# Patient Record
Sex: MALE | Race: BLACK OR AFRICAN AMERICAN | NOT HISPANIC OR LATINO | Employment: UNEMPLOYED | URBAN - NONMETROPOLITAN AREA
[De-identification: names, ages, dates, MRNs, and addresses within clinical notes are randomized per-mention and may not be internally consistent; named-entity substitution may affect disease eponyms.]

---

## 2017-03-27 ENCOUNTER — OFFICE VISIT (OUTPATIENT)
Dept: FAMILY MEDICINE CLINIC | Facility: CLINIC | Age: 37
End: 2017-03-27

## 2017-03-27 VITALS
SYSTOLIC BLOOD PRESSURE: 142 MMHG | HEART RATE: 103 BPM | DIASTOLIC BLOOD PRESSURE: 88 MMHG | BODY MASS INDEX: 44.1 KG/M2 | OXYGEN SATURATION: 97 % | HEIGHT: 71 IN | WEIGHT: 315 LBS

## 2017-03-27 DIAGNOSIS — E66.01 MORBID OBESITY, UNSPECIFIED OBESITY TYPE (HCC): ICD-10-CM

## 2017-03-27 DIAGNOSIS — F32.5 MAJOR DEPRESSIVE DISORDER WITH SINGLE EPISODE, IN FULL REMISSION (HCC): ICD-10-CM

## 2017-03-27 DIAGNOSIS — I10 ESSENTIAL HYPERTENSION: Primary | ICD-10-CM

## 2017-03-27 DIAGNOSIS — J45.990 EXERCISE-INDUCED ASTHMA: ICD-10-CM

## 2017-03-27 DIAGNOSIS — H61.22 EXCESSIVE CERUMEN IN LEFT EAR CANAL: ICD-10-CM

## 2017-03-27 DIAGNOSIS — H93.11 TINNITUS AURIUM, RIGHT: ICD-10-CM

## 2017-03-27 PROCEDURE — 99213 OFFICE O/P EST LOW 20 MIN: CPT | Performed by: FAMILY MEDICINE

## 2017-03-27 RX ORDER — ALBUTEROL SULFATE 90 UG/1
AEROSOL, METERED RESPIRATORY (INHALATION)
Qty: 18 G | Refills: 0 | Status: SHIPPED | OUTPATIENT
Start: 2017-03-27 | End: 2019-04-12 | Stop reason: SDUPTHER

## 2017-03-27 RX ORDER — AMLODIPINE BESYLATE 5 MG/1
5 TABLET ORAL DAILY
Qty: 30 TABLET | Refills: 2 | Status: SHIPPED | OUTPATIENT
Start: 2017-03-27 | End: 2017-07-07 | Stop reason: SDUPTHER

## 2017-03-27 NOTE — PROGRESS NOTES
Subjective:     Olivia Walden is a 36 y.o. male who presents for follow up of HTN.    New concerns: None. Stopped taking his Norvasc after it ran out approx 2 months ago.    Also c/o tinnitus of his R ear x 3 months.  Hearing normal, not feeling imbalanced.     Evaluation:   Blood pressure reading not indicated for medication reasons: No   Blood pressure reading refused by patient: No   Home blood pressure readings: Unknown   Blood pressure often elevated in physician office: Yes, Grade I hypertension   Onset of symptoms: N/A  Symptoms:   Headache: no   Visual disturbance: no   Fatigue: no   Dyspnea: no   Orthopnea: no   Edema: no   Chest pain: no   Palpitations: no   Diaphoresis: no    Risk Factors:   HLD, Sedentary lifestyle    Comorbid Conditions:   None    The following portions of the patient's history were reviewed and updated as appropriate: allergies, current medications, past family history, past medical history, past social history, past surgical history and problem list.    Preventative:  Over the past 2 weeks, have you felt down, depressed, or hopeless?No   Over the past 2 weeks, have you felt little interest or pleasure in doing things?No  Clinical depression screening refused by patient.No     On osteoporosis therapy?No     Past Medical Hx:  Past Medical History:   Diagnosis Date   • Depression     PHQ score 10, 4/11/16   • Exercise-induced asthma    • Pneumonia        Past Surgical Hx:  Past Surgical History:   Procedure Laterality Date   • TONSILLECTOMY  1988       Health Maintenance:  Health Maintenance   Topic Date Due   • TDAP/TD VACCINES (2 - Td) 04/11/2026   • INFLUENZA VACCINE  Addressed       Current Meds:    Current Outpatient Prescriptions:   •  albuterol (PROVENTIL HFA;VENTOLIN HFA) 108 (90 BASE) MCG/ACT inhaler, 2 puff(s) 15 minutes before exercise, Disp: 18 g, Rfl: 0  •  amLODIPine (NORVASC) 5 MG tablet, Take 1 tablet by mouth Daily., Disp: 30 tablet, Rfl: 2  •  carbamide  "peroxide (DEBROX) 6.5 % otic solution, Administer 5 drops into both ears 2 (Two) Times a Day for 4 days., Disp: 14.8 mL, Rfl: 0    Allergies:  Review of patient's allergies indicates no known allergies.    Family Hx:  Family History   Problem Relation Age of Onset   • Diabetes Paternal Aunt    • Hypertension Neg Hx    • Pancreatic cancer Neg Hx    • Cancer Neg Hx         Social History:  Social History     Social History   • Marital status: Single     Spouse name: N/A   • Number of children: N/A   • Years of education: N/A     Occupational History   • Not on file.     Social History Main Topics   • Smoking status: Never Smoker   • Smokeless tobacco: Not on file   • Alcohol use Yes      Comment: social (1-2 drinks/week)   • Drug use: No   • Sexual activity: Not on file     Other Topics Concern   • Not on file     Social History Narrative       Review of Systems  Review of Systems   Constitutional: Negative for activity change, appetite change, fatigue and fever.   HENT: Negative for ear pain and sore throat.    Eyes: Negative for pain and visual disturbance.   Respiratory: Negative for cough and shortness of breath.    Cardiovascular: Negative for chest pain and palpitations.   Gastrointestinal: Negative for abdominal pain and nausea.   Endocrine: Negative for cold intolerance and heat intolerance.   Genitourinary: Negative for difficulty urinating and dysuria.   Musculoskeletal: Negative for arthralgias and gait problem.   Skin: Negative for color change and rash.   Neurological: Negative for dizziness, weakness and headaches.   Hematological: Negative for adenopathy. Does not bruise/bleed easily.   Psychiatric/Behavioral: Negative for agitation, confusion and sleep disturbance.     Objective:     /88  Pulse 103  Ht 71\" (180.3 cm)  Wt (!) 324 lb 4.8 oz (147 kg)  SpO2 97%  BMI 45.23 kg/m2  Physical Exam   Constitutional: He is oriented to person, place, and time. No distress.   HENT:   Head: Normocephalic " and atraumatic.   Right Ear: External ear normal.   Left Ear: External ear normal.   Nose: Nose normal.   Mouth/Throat: Oropharynx is clear and moist.   Hearing normal by rubbing finger test b/l, cerumen in L ear canal   Eyes: Conjunctivae and EOM are normal. Pupils are equal, round, and reactive to light.   Neck: Normal range of motion. Neck supple. No tracheal deviation present. No thyromegaly present.   Cardiovascular: Normal rate, regular rhythm, normal heart sounds and intact distal pulses.  Exam reveals no gallop and no friction rub.    No murmur heard.  Pulmonary/Chest: Effort normal and breath sounds normal. No respiratory distress. He has no wheezes. He has no rales. He exhibits no tenderness.   Abdominal: Soft. Bowel sounds are normal. He exhibits no distension and no mass. There is no tenderness. There is no rebound and no guarding.   Musculoskeletal: Normal range of motion. He exhibits no edema or tenderness.   Neurological: He is alert and oriented to person, place, and time.   Skin: Skin is warm and dry. No rash noted. He is not diaphoretic. No erythema. No pallor.     Lab Review  none     Assessment:     1. Essential hypertension    2. Morbid obesity, unspecified obesity type    3. Excessive cerumen in left ear canal    4. Exercise-induced asthma    5. Tinnitus aurium, right    6. Major depressive disorder with single episode, in full remission         Plan:   Olivia was seen today for follow-up, hypertension and fluid in ears.    Diagnoses and all orders for this visit:    Essential hypertension  -     amLODIPine (NORVASC) 5 MG tablet; Take 1 tablet by mouth Daily.    Morbid obesity, unspecified obesity type        - Recommended diet modification and exercise    Excessive cerumen in left ear canal  -     carbamide peroxide (DEBROX) 6.5 % otic solution; Administer 5 drops into both ears 2 (Two) Times a Day for 4 days.  Tried to call pt, but listed number is not working.  Called mother and told her to  have him call me.  - If unimproved with drops, will need referral to ENT for cerumen removal.    Exercise-induced asthma  -     albuterol (PROVENTIL HFA;VENTOLIN HFA) 108 (90 BASE) MCG/ACT inhaler; 2 puff(s) 15 minutes before exercise    Tinnitus aurium, right        - Explained to pt that not a lot can be done for tinnitus; recommended noise machine.    1.  Rx changes: Refilled Norvasc.  2.  Education:    Presented an overview of HTN, expected course, considerations, risk factors, and exacerbation prevention.   Discussed treatment options for HTN: yes   Recommended restricted dietary Na intake: yes   Discussed patient action plan for HTN: yes  3.  Compliance at present is estimated to be good. Efforts to improve compliance (if necessary) will be directed at dietary modifications: increased fruits and veggies and increased exercise.  Bp mildly elevated, advised he can buy OTC home bp unit, and check periodically for possible white coat hypertension  4.  Follow up: 3 months    GOALS:  Lose 12 lb by next appt  BARRIERS TO GOALS:  Insight    Preventative:  up to date and documented   Recommended:none  Smoking cessation counseling was provided.  does not drink  eat more fruits and vegetables, increase water intake and increase physical activity    RISK SCORE: 4          This document has been electronically signed by Rahat Boone MD on March 27, 2017 5:00 PM

## 2017-06-19 ENCOUNTER — OFFICE VISIT (OUTPATIENT)
Dept: FAMILY MEDICINE CLINIC | Facility: CLINIC | Age: 37
End: 2017-06-19

## 2017-06-19 VITALS
HEIGHT: 71 IN | HEART RATE: 101 BPM | SYSTOLIC BLOOD PRESSURE: 140 MMHG | WEIGHT: 308 LBS | DIASTOLIC BLOOD PRESSURE: 90 MMHG | BODY MASS INDEX: 43.12 KG/M2 | OXYGEN SATURATION: 96 %

## 2017-06-19 DIAGNOSIS — E66.01 MORBID OBESITY, UNSPECIFIED OBESITY TYPE (HCC): ICD-10-CM

## 2017-06-19 DIAGNOSIS — S93.401D SPRAIN OF RIGHT ANKLE, UNSPECIFIED LIGAMENT, SUBSEQUENT ENCOUNTER: ICD-10-CM

## 2017-06-19 DIAGNOSIS — I10 ESSENTIAL HYPERTENSION: Primary | ICD-10-CM

## 2017-06-19 PROBLEM — S93.409A ANKLE SPRAIN: Status: ACTIVE | Noted: 2017-06-19

## 2017-06-19 PROCEDURE — 99213 OFFICE O/P EST LOW 20 MIN: CPT | Performed by: FAMILY MEDICINE

## 2017-06-20 NOTE — PROGRESS NOTES
"  Subjective:     Olivia Walden is a 36 y.o. male who presents for follow up of HTN.    New concerns:  Right ankle pain.  He was playing football 3 weeks ago, when his cleat got stuck in the ground and his ankle \"twisted.\" Initially had some swelling, but this improved with ice.  He initially presented to [facility], reports X-rays were unremarkable, was told he has a sprain, and was subsequently discharged.  Has been trying to rest it, elevate when possible but it is painful to bear weight.    Evaluation:   Blood pressure reading not indicated for medication reasons: No   Blood pressure reading refused by patient: No   Home blood pressure readings: Does not check regularly (no machine).  170/110 last time he checked, but he had been off medication for 1 week.   Blood pressure often elevated in physician office: Yes, Grade I hypertension   Onset of symptoms: N/A  Symptoms:   Headache: no   Visual disturbance: no   Fatigue: no   Dyspnea: no   Orthopnea: no   Edema: no   Chest pain: no   Palpitations: no   Diaphoresis: no    Risk Factors:   HLD, Sedentary lifestyle    Comorbid Conditions:   None    The following portions of the patient's history were reviewed and updated as appropriate: allergies, current medications, past family history, past medical history, past social history, past surgical history and problem list.    Preventative:  Over the past 2 weeks, have you felt down, depressed, or hopeless?No   Over the past 2 weeks, have you felt little interest or pleasure in doing things?No  Clinical depression screening refused by patient.No     On osteoporosis therapy?No     Past Medical Hx:  Past Medical History:   Diagnosis Date   • Depression     PHQ score 10, 4/11/16   • Exercise-induced asthma    • Pneumonia        Past Surgical Hx:  Past Surgical History:   Procedure Laterality Date   • TONSILLECTOMY  1988       Health Maintenance:  Health Maintenance   Topic Date Due   • INFLUENZA VACCINE  " 08/01/2017   • TDAP/TD VACCINES (2 - Td) 04/11/2026       Current Meds:    Current Outpatient Prescriptions:   •  albuterol (PROVENTIL HFA;VENTOLIN HFA) 108 (90 BASE) MCG/ACT inhaler, 2 puff(s) 15 minutes before exercise, Disp: 18 g, Rfl: 0  •  amLODIPine (NORVASC) 5 MG tablet, Take 1 tablet by mouth Daily., Disp: 30 tablet, Rfl: 2    Allergies:  Review of patient's allergies indicates no known allergies.    Family Hx:  Family History   Problem Relation Age of Onset   • Diabetes Paternal Aunt    • Hypertension Neg Hx    • Pancreatic cancer Neg Hx    • Cancer Neg Hx         Social History:  Social History     Social History   • Marital status: Single     Spouse name: N/A   • Number of children: N/A   • Years of education: N/A     Occupational History   • Not on file.     Social History Main Topics   • Smoking status: Never Smoker   • Smokeless tobacco: Not on file   • Alcohol use Yes      Comment: social (1-2 drinks/week)   • Drug use: No   • Sexual activity: Not on file     Other Topics Concern   • Not on file     Social History Narrative       Review of Systems  Review of Systems   Constitutional: Negative for activity change, appetite change, fatigue and fever.   HENT: Negative for ear pain and sore throat.    Eyes: Negative for pain and visual disturbance.   Respiratory: Negative for cough and shortness of breath.    Cardiovascular: Negative for chest pain and palpitations.   Gastrointestinal: Negative for abdominal pain and nausea.   Endocrine: Negative for cold intolerance and heat intolerance.   Genitourinary: Negative for difficulty urinating and dysuria.   Musculoskeletal: Negative for arthralgias and gait problem.   Skin: Negative for color change and rash.   Neurological: Negative for dizziness, weakness and headaches.   Hematological: Negative for adenopathy. Does not bruise/bleed easily.   Psychiatric/Behavioral: Negative for agitation, confusion and sleep disturbance.     Objective:     /90   "Pulse 101  Ht 71\" (180.3 cm)  Wt (!) 308 lb (140 kg)  SpO2 96%  BMI 42.96 kg/m2  Physical Exam   Constitutional: He is oriented to person, place, and time. No distress.   HENT:   Head: Normocephalic and atraumatic.   Right Ear: External ear normal.   Left Ear: External ear normal.   Nose: Nose normal.   Mouth/Throat: Oropharynx is clear and moist.   Eyes: Conjunctivae and EOM are normal. Pupils are equal, round, and reactive to light.   Neck: Normal range of motion. Neck supple. No tracheal deviation present. No thyromegaly present.   Cardiovascular: Normal rate, regular rhythm, normal heart sounds and intact distal pulses.  Exam reveals no gallop and no friction rub.    No murmur heard.  Pulmonary/Chest: Effort normal and breath sounds normal. No respiratory distress. He has no wheezes. He has no rales. He exhibits no tenderness.   Abdominal: Soft. Bowel sounds are normal. He exhibits no distension and no mass. There is no tenderness. There is no rebound and no guarding.   Musculoskeletal: Normal range of motion. He exhibits no edema.        Right ankle: He exhibits no swelling, no ecchymosis, no deformity and no laceration. Tenderness. Lateral malleolus and medial malleolus tenderness found. Achilles tendon exhibits pain. Achilles tendon exhibits no defect and normal Mason's test results.   Neurological: He is alert and oriented to person, place, and time.   Skin: Skin is warm and dry. No rash noted. He is not diaphoretic. No erythema. No pallor.     Lab Review  none     Assessment:     1. Essential hypertension    2. Morbid obesity, unspecified obesity type    3. Sprain of right ankle, unspecified ligament, subsequent encounter         Plan:   1. Essential hypertension    2. Morbid obesity, unspecified obesity type  -Patient has lost 16 lb since last seen, through diet modification and exercise.  Encouraged patient on his progress, explained that blood pressure will improve as he continues to lose " weight.    3. Sprain of right ankle, unspecified ligament, subsequent encounter  -Educational materials given to patient (DASH diet, ankle sprain including exercises)    1.  Rx changes: None  2.  Education:    Presented an overview of HTN, expected course, considerations, risk factors, and exacerbation prevention.   Discussed treatment options for HTN: yes   Recommended restricted dietary Na intake: yes   Discussed patient action plan for HTN: yes  3.  Compliance at present is estimated to be good. Efforts to improve compliance (if necessary) will be directed at dietary modifications: increased fruits and veggies and increased exercise.  Bp mildly elevated, advised he can buy OTC home bp unit, and check periodically for possible white coat hypertension  4.  Follow up: 1 months    GOALS:  Lose 4-8 lb by next appt  BARRIERS TO GOALS:  Insight    Preventative:  up to date and documented   Recommended:none  Smoking cessation counseling was provided.  does not drink  eat more fruits and vegetables, increase water intake and increase physical activity    RISK SCORE: 4          This document has been electronically signed by Rahat Boone MD on June 19, 2017 9:34 PM

## 2017-07-07 DIAGNOSIS — I10 ESSENTIAL HYPERTENSION: ICD-10-CM

## 2017-07-07 RX ORDER — AMLODIPINE BESYLATE 5 MG/1
TABLET ORAL
Qty: 30 TABLET | Refills: 0 | Status: SHIPPED | OUTPATIENT
Start: 2017-07-07 | End: 2017-07-18 | Stop reason: SDUPTHER

## 2017-07-18 ENCOUNTER — OFFICE VISIT (OUTPATIENT)
Dept: FAMILY MEDICINE CLINIC | Facility: CLINIC | Age: 37
End: 2017-07-18

## 2017-07-18 VITALS
WEIGHT: 307 LBS | HEIGHT: 71 IN | HEART RATE: 96 BPM | DIASTOLIC BLOOD PRESSURE: 72 MMHG | OXYGEN SATURATION: 96 % | BODY MASS INDEX: 42.98 KG/M2 | SYSTOLIC BLOOD PRESSURE: 136 MMHG

## 2017-07-18 DIAGNOSIS — E66.01 MORBID OBESITY, UNSPECIFIED OBESITY TYPE (HCC): ICD-10-CM

## 2017-07-18 DIAGNOSIS — I10 ESSENTIAL HYPERTENSION: Primary | ICD-10-CM

## 2017-07-18 DIAGNOSIS — M72.2 PLANTAR FASCIITIS: ICD-10-CM

## 2017-07-18 PROCEDURE — 99213 OFFICE O/P EST LOW 20 MIN: CPT | Performed by: FAMILY MEDICINE

## 2017-07-18 RX ORDER — AMLODIPINE BESYLATE 5 MG/1
5 TABLET ORAL DAILY
Qty: 30 TABLET | Refills: 3 | Status: SHIPPED | OUTPATIENT
Start: 2017-07-18 | End: 2017-10-20 | Stop reason: SDUPTHER

## 2017-07-18 NOTE — PROGRESS NOTES
Subjective:     Chief Complaint   Patient presents with   • Hypertension   • Ankle Injury   • Foot Pain     Olivia Walden is a 37 y.o. male who presents for follow up of HTN, ankle sprain.    He states his ankle pain is considerably improved.  He has been doing ankle exercises as instructed.  However he has noticed right foot pain, on the plantar surface.  When asked if the pain is primarily is with the first step in the morning he says sometimes, sometimes not.    Evaluation:   Blood pressure reading not indicated for medication reasons: No   Blood pressure reading refused by patient: No   Home blood pressure readings: Does not check regularly (no machine).   Blood pressure often elevated in physician office: Yes, Grade I hypertension   Onset of symptoms: N/A  Symptoms:   Headache: no   Visual disturbance: no   Fatigue: no   Dyspnea: no   Orthopnea: no   Edema: no   Chest pain: no   Palpitations: no   Diaphoresis: no    Risk Factors:   HLD, Sedentary lifestyle    Comorbid Conditions:   None    The following portions of the patient's history were reviewed and updated as appropriate: allergies, current medications, past family history, past medical history, past social history, past surgical history and problem list.    Preventative:  Over the past 2 weeks, have you felt down, depressed, or hopeless?No   Over the past 2 weeks, have you felt little interest or pleasure in doing things?No  Clinical depression screening refused by patient.No      PHQ-9 Depression Screening 7/18/2017   Little interest or pleasure in doing things 1   Feeling down, depressed, or hopeless 0   Trouble falling or staying asleep, or sleeping too much 0   Feeling tired or having little energy 1   Poor appetite or overeating 0   Feeling bad about yourself - or that you are a failure or have let yourself or your family down 0   Trouble concentrating on things, such as reading the newspaper or watching television 0   Moving or speaking  so slowly that other people could have noticed. Or the opposite - being so fidgety or restless that you have been moving around a lot more than usual 0   Thoughts that you would be better off dead, or of hurting yourself in some way 0   PHQ-9 Total Score 2   If you checked off any problems, how difficult have these problems made it for you to do your work, take care of things at home, or get along with other people? Not difficult at all       On osteoporosis therapy?No     Past Medical Hx:  Past Medical History:   Diagnosis Date   • Depression     PHQ score 10, 4/11/16   • Exercise-induced asthma    • Pneumonia        Past Surgical Hx:  Past Surgical History:   Procedure Laterality Date   • TONSILLECTOMY  1988       Health Maintenance:  Health Maintenance   Topic Date Due   • INFLUENZA VACCINE  08/01/2017   • TDAP/TD VACCINES (2 - Td) 04/11/2026       Current Meds:    Current Outpatient Prescriptions:   •  albuterol (PROVENTIL HFA;VENTOLIN HFA) 108 (90 BASE) MCG/ACT inhaler, 2 puff(s) 15 minutes before exercise, Disp: 18 g, Rfl: 0  •  amLODIPine (NORVASC) 5 MG tablet, Take 1 tablet by mouth Daily., Disp: 30 tablet, Rfl: 3    Allergies:  Review of patient's allergies indicates no known allergies.    Family Hx:  Family History   Problem Relation Age of Onset   • Diabetes Paternal Aunt    • Hypertension Neg Hx    • Pancreatic cancer Neg Hx    • Cancer Neg Hx         Social History:  Social History     Social History   • Marital status: Single     Spouse name: N/A   • Number of children: N/A   • Years of education: N/A     Occupational History   • Not on file.     Social History Main Topics   • Smoking status: Never Smoker   • Smokeless tobacco: Not on file   • Alcohol use Yes      Comment: social (1-2 drinks/week)   • Drug use: No   • Sexual activity: Not on file     Other Topics Concern   • Not on file     Social History Narrative       Review of Systems  Review of Systems   Constitutional: Negative for activity  "change, appetite change, fatigue and fever.   HENT: Negative for ear pain and sore throat.    Eyes: Negative for pain and visual disturbance.   Respiratory: Negative for cough and shortness of breath.    Cardiovascular: Negative for chest pain and palpitations.   Gastrointestinal: Negative for abdominal pain and nausea.   Endocrine: Negative for cold intolerance and heat intolerance.   Genitourinary: Negative for difficulty urinating and dysuria.   Musculoskeletal: Negative for arthralgias and gait problem.   Skin: Negative for color change and rash.   Neurological: Negative for dizziness, weakness and headaches.   Hematological: Negative for adenopathy. Does not bruise/bleed easily.   Psychiatric/Behavioral: Negative for agitation, confusion and sleep disturbance.     Objective:     /72 (BP Location: Left arm, Patient Position: Sitting, Cuff Size: Adult)  Pulse 96  Ht 71\" (180.3 cm)  Wt (!) 307 lb (139 kg)  SpO2 96%  BMI 42.82 kg/m2  Physical Exam   Constitutional: He is oriented to person, place, and time. No distress.   HENT:   Head: Normocephalic and atraumatic.   Right Ear: External ear normal.   Left Ear: External ear normal.   Nose: Nose normal.   Mouth/Throat: Oropharynx is clear and moist.   Eyes: Conjunctivae and EOM are normal. Pupils are equal, round, and reactive to light.   Neck: Normal range of motion. Neck supple. No tracheal deviation present. No thyromegaly present.   Cardiovascular: Normal rate, regular rhythm, normal heart sounds and intact distal pulses.  Exam reveals no gallop and no friction rub.    No murmur heard.  Pulmonary/Chest: Effort normal and breath sounds normal. No respiratory distress. He has no wheezes. He has no rales. He exhibits no tenderness.   Abdominal: Soft. Bowel sounds are normal. He exhibits no distension and no mass. There is no tenderness. There is no rebound and no guarding.   Musculoskeletal: Normal range of motion. He exhibits no edema.        Right foot: " There is tenderness.   Calcaneal tenderness, but not at medial tubercle   Neurological: He is alert and oriented to person, place, and time.   Skin: Skin is warm and dry. No rash noted. He is not diaphoretic. No erythema. No pallor.     Lab Review  none     Assessment:     1. Essential hypertension    2. Morbid obesity, unspecified obesity type    3. Atypical Plantar fasciitis         Plan:   Olivia was seen today for hypertension, ankle injury and foot pain.    Diagnoses and all orders for this visit:    Essential hypertension  -     amLODIPine (NORVASC) 5 MG tablet; Take 1 tablet by mouth Daily.    Morbid obesity, unspecified obesity type        - Patient has lost 1 lb since last seen, through diet modification and exercise.  Encouraged patient on his progress, explained that blood pressure will improve as he continues to lose weight.    Atypical Plantar fasciitis       - Educational materials given to patient (exercises), recommended to buy heel insert, continue with OTC anti-inflammatories    1.  Rx changes: None  2.  Education:    Presented an overview of HTN, expected course, considerations, risk factors, and exacerbation prevention.   Discussed treatment options for HTN: yes   Recommended restricted dietary Na intake: yes   Discussed patient action plan for HTN: yes  3.  Compliance at present is estimated to be good. Efforts to improve compliance (if necessary) will be directed at dietary modifications: increased fruits and veggies and increased exercise.  Bp mildly elevated, advised he can buy OTC home bp unit, and check periodically for possible white coat hypertension  4.  Follow up: 3 months    GOALS:  Lose 4-8 lb by next appt  BARRIERS TO GOALS:  Insight    Preventative:  up to date and documented   Recommended:none  Smoking cessation counseling was provided.  does not drink  eat more fruits and vegetables, increase water intake and increase physical activity    RISK SCORE: 4          This document has  been electronically signed by Rahat Boone MD on July 18, 2017 11:10 PM

## 2017-07-24 NOTE — PROGRESS NOTES
I have reviewed the notes, assessments, and/or procedures performed by Dr. Boone, I concur with her/his documentation of Olivia Walden.

## 2017-08-16 DIAGNOSIS — I10 ESSENTIAL HYPERTENSION: ICD-10-CM

## 2017-08-17 RX ORDER — AMLODIPINE BESYLATE 5 MG/1
TABLET ORAL
Qty: 30 TABLET | Refills: 0 | Status: SHIPPED | OUTPATIENT
Start: 2017-08-17 | End: 2017-09-30 | Stop reason: SDUPTHER

## 2017-09-30 DIAGNOSIS — I10 ESSENTIAL HYPERTENSION: ICD-10-CM

## 2017-10-02 RX ORDER — AMLODIPINE BESYLATE 5 MG/1
TABLET ORAL
Qty: 90 TABLET | Refills: 1 | Status: SHIPPED | OUTPATIENT
Start: 2017-10-02 | End: 2017-10-20 | Stop reason: SDUPTHER

## 2017-10-20 ENCOUNTER — OFFICE VISIT (OUTPATIENT)
Dept: FAMILY MEDICINE CLINIC | Facility: CLINIC | Age: 37
End: 2017-10-20

## 2017-10-20 VITALS
SYSTOLIC BLOOD PRESSURE: 148 MMHG | DIASTOLIC BLOOD PRESSURE: 88 MMHG | HEART RATE: 71 BPM | BODY MASS INDEX: 44.1 KG/M2 | WEIGHT: 315 LBS | HEIGHT: 71 IN | OXYGEN SATURATION: 96 %

## 2017-10-20 DIAGNOSIS — J45.990 EXERCISE-INDUCED ASTHMA: ICD-10-CM

## 2017-10-20 DIAGNOSIS — I10 ESSENTIAL HYPERTENSION: Primary | ICD-10-CM

## 2017-10-20 DIAGNOSIS — E66.01 MORBID OBESITY (HCC): ICD-10-CM

## 2017-10-20 DIAGNOSIS — H61.22 EXCESSIVE CERUMEN IN LEFT EAR CANAL: ICD-10-CM

## 2017-10-20 PROCEDURE — 99213 OFFICE O/P EST LOW 20 MIN: CPT | Performed by: FAMILY MEDICINE

## 2017-10-20 RX ORDER — AMLODIPINE BESYLATE 5 MG/1
5 TABLET ORAL DAILY
Qty: 30 TABLET | Refills: 1 | Status: SHIPPED | OUTPATIENT
Start: 2017-10-20 | End: 2017-11-27 | Stop reason: SDUPTHER

## 2017-10-20 NOTE — PROGRESS NOTES
Subjective:     Chief Complaint   Patient presents with   • Hypertension     Olivia Walden is a 37 y.o. male who presents for follow up of HTN, plantar fasciitis.    He states his plantar fasciitis pain has significantly improved since he's been doing the exercises.    Evaluation:   Blood pressure reading not indicated for medication reasons: No   Blood pressure reading refused by patient: No   Home blood pressure readings: Does not check regularly (no machine).  They do check his blood pressure at work every 2 weeks (on Wednesday's), however he has not had it checked recently there.   Blood pressure often elevated in physician office: Yes, Grade I hypertension   Onset of symptoms: N/A  Symptoms:   Headache: no   Visual disturbance: no   Fatigue: no   Dyspnea: no   Orthopnea: no   Edema: no   Chest pain: no   Palpitations: no   Diaphoresis: no    Risk Factors:   HLD, Sedentary lifestyle    Comorbid Conditions:   None    The following portions of the patient's history were reviewed and updated as appropriate: allergies, current medications, past family history, past medical history, past social history, past surgical history and problem list.    Preventative:  Over the past 2 weeks, have you felt down, depressed, or hopeless?No   Over the past 2 weeks, have you felt little interest or pleasure in doing things?No  Clinical depression screening refused by patient.No      PHQ-2/PHQ-9 Depression Screening 7/18/2017   Little interest or pleasure in doing things 1   Feeling down, depressed, or hopeless 0   Trouble falling or staying asleep, or sleeping too much 0   Feeling tired or having little energy 1   Poor appetite or overeating 0   Feeling bad about yourself - or that you are a failure or have let yourself or your family down 0   Trouble concentrating on things, such as reading the newspaper or watching television 0   Moving or speaking so slowly that other people could have noticed. Or the opposite - being  so fidgety or restless that you have been moving around a lot more than usual 0   Thoughts that you would be better off dead, or of hurting yourself in some way 0   Total Score 2   If you checked off any problems, how difficult have these problems made it for you to do your work, take care of things at home, or get along with other people? Not difficult at all       On osteoporosis therapy?No     Past Medical Hx:  Past Medical History:   Diagnosis Date   • Depression     PHQ score 10, 4/11/16   • Exercise-induced asthma    • Pneumonia        Past Surgical Hx:  Past Surgical History:   Procedure Laterality Date   • TONSILLECTOMY  1988       Health Maintenance:  Health Maintenance   Topic Date Due   • TDAP/TD VACCINES (2 - Td) 04/11/2026   • INFLUENZA VACCINE  Addressed       Current Meds:    Current Outpatient Prescriptions:   •  albuterol (PROVENTIL HFA;VENTOLIN HFA) 108 (90 BASE) MCG/ACT inhaler, 2 puff(s) 15 minutes before exercise, Disp: 18 g, Rfl: 0  •  amLODIPine (NORVASC) 5 MG tablet, Take 1 tablet by mouth Daily., Disp: 30 tablet, Rfl: 1    Allergies:  Review of patient's allergies indicates no known allergies.    Family Hx:  Family History   Problem Relation Age of Onset   • Diabetes Paternal Aunt    • Hypertension Neg Hx    • Pancreatic cancer Neg Hx    • Cancer Neg Hx         Social History:  Social History     Social History   • Marital status: Single     Spouse name: N/A   • Number of children: N/A   • Years of education: N/A     Occupational History   • Not on file.     Social History Main Topics   • Smoking status: Never Smoker   • Smokeless tobacco: Not on file   • Alcohol use Yes      Comment: social (1-2 drinks/week)   • Drug use: No   • Sexual activity: Not on file     Other Topics Concern   • Not on file     Social History Narrative       Review of Systems  Review of Systems   Constitutional: Negative for activity change, appetite change, fatigue and fever.   HENT: Positive for ear pain (not  "\"pain\" but abnormal sensation). Negative for sore throat.    Eyes: Negative for pain and visual disturbance.   Respiratory: Negative for cough and shortness of breath.    Cardiovascular: Negative for chest pain and palpitations.   Gastrointestinal: Negative for abdominal pain and nausea.   Endocrine: Negative for cold intolerance and heat intolerance.   Genitourinary: Negative for difficulty urinating and dysuria.   Musculoskeletal: Negative for arthralgias and gait problem.   Skin: Negative for color change and rash.   Neurological: Negative for dizziness, weakness and headaches.   Hematological: Negative for adenopathy. Does not bruise/bleed easily.   Psychiatric/Behavioral: Negative for agitation, confusion and sleep disturbance.     Objective:     /88 (BP Location: Left arm, Patient Position: Sitting, Cuff Size: Adult)  Pulse 71  Ht 71\" (180.3 cm)  Wt (!) 318 lb 6 oz (144 kg)  SpO2 96%  BMI 44.4 kg/m2  Physical Exam   Constitutional: He is oriented to person, place, and time. No distress.   HENT:   Head: Normocephalic and atraumatic.   Right Ear: External ear normal.   Left Ear: External ear normal.   Nose: Nose normal.   Mouth/Throat: Oropharynx is clear and moist.   Cerumen bilaterally   Eyes: Conjunctivae and EOM are normal. Pupils are equal, round, and reactive to light.   Neck: Normal range of motion. Neck supple. No tracheal deviation present. No thyromegaly present.   Cardiovascular: Normal rate, regular rhythm, normal heart sounds and intact distal pulses.  Exam reveals no gallop and no friction rub.    No murmur heard.  Pulmonary/Chest: Effort normal and breath sounds normal. No respiratory distress. He has no wheezes. He has no rales. He exhibits no tenderness.   Abdominal: Soft. Bowel sounds are normal. He exhibits no distension and no mass. There is no tenderness. There is no rebound and no guarding.   Musculoskeletal: Normal range of motion. He exhibits no edema.   Neurological: He is " alert and oriented to person, place, and time.   Skin: Skin is warm and dry. No rash noted. He is not diaphoretic. No erythema. No pallor.     Lab Review  none     Assessment:     1. Essential hypertension    2. Exercise-induced asthma    3. Morbid obesity    4. Excessive cerumen in left ear canal         Plan:   Olivia was seen today for hypertension.    Diagnoses and all orders for this visit:    Essential hypertension  - Repeat values were again elevated  -     amLODIPine (NORVASC) 5 MG tablet; Take 1 tablet by mouth Daily.  - Advised to closely monitor blood pressure, at the very least have it checked at work, bring readings to follow-up appointment.  No medication changes at this time.  Discussed importance of DASH diet once again.    Exercise-induced asthma    Morbid obesity         - Gained 11 pounds since last seen.  Recommended diet modification and exercise.    Excessive cerumen in left ear canal         - Can resume Debrox (still has some)    1.  Rx changes: None  2.  Education:    Presented an overview of HTN, expected course, considerations, risk factors, and exacerbation prevention.   Discussed treatment options for HTN: yes   Recommended restricted dietary Na intake: yes   Discussed patient action plan for HTN: yes  3.  Compliance at present is estimated to be good. Efforts to improve compliance (if necessary) will be directed at dietary modifications: increased fruits and veggies and increased exercise.    4.  Follow up: 1 months    GOALS:  Lose 4 lb by next appt  BARRIERS TO GOALS:  Insight    Preventative:  up to date and documented   Recommended:none.  Had flu shot at work.  Smoking cessation counseling was provided.  does not drink  eat more fruits and vegetables, increase water intake and increase physical activity    RISK SCORE: 4          This document has been electronically signed by Rahat Boone MD on October 22, 2017 7:15 PM

## 2017-10-23 NOTE — PROGRESS NOTES
I have reviewed the notes, assessments, and/or procedures performed by Rahat Boone MD , I concur with her/his documentation of Olivia Walden.         This document has been electronically signed by Ashley Hilario MD on October 23, 2017 9:27 AM

## 2017-11-27 ENCOUNTER — OFFICE VISIT (OUTPATIENT)
Dept: FAMILY MEDICINE CLINIC | Facility: CLINIC | Age: 37
End: 2017-11-27

## 2017-11-27 VITALS
BODY MASS INDEX: 44.1 KG/M2 | HEIGHT: 71 IN | TEMPERATURE: 97.8 F | WEIGHT: 315 LBS | DIASTOLIC BLOOD PRESSURE: 96 MMHG | OXYGEN SATURATION: 95 % | SYSTOLIC BLOOD PRESSURE: 150 MMHG | HEART RATE: 102 BPM

## 2017-11-27 DIAGNOSIS — I10 ESSENTIAL HYPERTENSION: Primary | ICD-10-CM

## 2017-11-27 DIAGNOSIS — H91.93 DECREASED HEARING OF BOTH EARS: ICD-10-CM

## 2017-11-27 DIAGNOSIS — E66.01 MORBID OBESITY (HCC): ICD-10-CM

## 2017-11-27 PROCEDURE — 99213 OFFICE O/P EST LOW 20 MIN: CPT | Performed by: FAMILY MEDICINE

## 2017-11-27 RX ORDER — AMLODIPINE BESYLATE 10 MG/1
10 TABLET ORAL DAILY
Qty: 30 TABLET | Refills: 3 | Status: SHIPPED | OUTPATIENT
Start: 2017-11-27 | End: 2018-09-26

## 2017-11-27 RX ORDER — AMLODIPINE BESYLATE 5 MG/1
10 TABLET ORAL DAILY
Qty: 30 TABLET | Refills: 1 | Status: SHIPPED | OUTPATIENT
Start: 2017-11-27 | End: 2017-11-27 | Stop reason: DRUGHIGH

## 2017-11-28 PROBLEM — H91.93 DECREASED HEARING OF BOTH EARS: Status: ACTIVE | Noted: 2017-11-28

## 2017-11-29 NOTE — PROGRESS NOTES
Subjective:     Chief Complaint   Patient presents with   • Hypertension     Olivia Walden is a 37 y.o. male who presents for follow up of HTN    Evaluation:   Blood pressure reading not indicated for medication reasons: No   Blood pressure reading refused by patient: No   Home blood pressure readings: Does not check regularly (no machine).  They do check it periodically at work, he states was checked once since I last saw him and he thinks the systolic was in the 180s.   Blood pressure often elevated in physician office: Yes, Grade I hypertension   Onset of symptoms: N/A  Symptoms:   Headache: no   Visual disturbance: no   Fatigue: no   Dyspnea: no   Orthopnea: no   Edema: no   Chest pain: no   Palpitations: no   Diaphoresis: no    Risk Factors:   HLD, Sedentary lifestyle    Comorbid Conditions:   None    The following portions of the patient's history were reviewed and updated as appropriate: allergies, current medications, past family history, past medical history, past social history, past surgical history and problem list.    Preventative:  Over the past 2 weeks, have you felt down, depressed, or hopeless?No   Over the past 2 weeks, have you felt little interest or pleasure in doing things?No  Clinical depression screening refused by patient.No      PHQ-2/PHQ-9 Depression Screening 7/18/2017   Little interest or pleasure in doing things 1   Feeling down, depressed, or hopeless 0   Trouble falling or staying asleep, or sleeping too much 0   Feeling tired or having little energy 1   Poor appetite or overeating 0   Feeling bad about yourself - or that you are a failure or have let yourself or your family down 0   Trouble concentrating on things, such as reading the newspaper or watching television 0   Moving or speaking so slowly that other people could have noticed. Or the opposite - being so fidgety or restless that you have been moving around a lot more than usual 0   Thoughts that you would be better  "off dead, or of hurting yourself in some way 0   Total Score 2   If you checked off any problems, how difficult have these problems made it for you to do your work, take care of things at home, or get along with other people? Not difficult at all       On osteoporosis therapy?No     Past Medical Hx:  Past Medical History:   Diagnosis Date   • Depression     PHQ score 10, 4/11/16   • Exercise-induced asthma    • Pneumonia        Past Surgical Hx:  Past Surgical History:   Procedure Laterality Date   • TONSILLECTOMY  1988       Health Maintenance:  Health Maintenance   Topic Date Due   • TDAP/TD VACCINES (2 - Td) 04/11/2026   • INFLUENZA VACCINE  Addressed       Current Meds:    Current Outpatient Prescriptions:   •  albuterol (PROVENTIL HFA;VENTOLIN HFA) 108 (90 BASE) MCG/ACT inhaler, 2 puff(s) 15 minutes before exercise, Disp: 18 g, Rfl: 0  •  amLODIPine (NORVASC) 10 MG tablet, Take 1 tablet by mouth Daily., Disp: 30 tablet, Rfl: 3    Allergies:  Review of patient's allergies indicates no known allergies.    Family Hx:  Family History   Problem Relation Age of Onset   • Diabetes Paternal Aunt    • Hypertension Neg Hx    • Pancreatic cancer Neg Hx    • Cancer Neg Hx         Social History:  Social History     Social History   • Marital status: Single     Spouse name: N/A   • Number of children: N/A   • Years of education: N/A     Occupational History   • Not on file.     Social History Main Topics   • Smoking status: Never Smoker   • Smokeless tobacco: Not on file   • Alcohol use Yes      Comment: social (1-2 drinks/week)   • Drug use: No   • Sexual activity: Not on file     Other Topics Concern   • Not on file     Social History Narrative       Review of Systems  Review of Systems   Constitutional: Negative for activity change, appetite change, fatigue and fever.   HENT: Positive for ear pain (not \"pain\" but abnormal sensation) and hearing loss (difficulty hearing voices at times). Negative for sore throat.    Eyes: " "Negative for pain and visual disturbance.   Respiratory: Negative for cough and shortness of breath.    Cardiovascular: Negative for chest pain and palpitations.   Gastrointestinal: Negative for abdominal pain and nausea.   Endocrine: Negative for cold intolerance and heat intolerance.   Genitourinary: Negative for difficulty urinating and dysuria.   Musculoskeletal: Negative for arthralgias and gait problem.   Skin: Negative for color change and rash.   Neurological: Negative for dizziness, weakness and headaches.   Hematological: Negative for adenopathy. Does not bruise/bleed easily.   Psychiatric/Behavioral: Negative for agitation, confusion and sleep disturbance.     Objective:     Vitals:    11/27/17 1028 11/27/17 1049   BP: (!) 164/109 150/96   BP Location: Right arm Left arm   Patient Position: Sitting    Cuff Size: Large Adult    Pulse: 102    Temp: 97.8 °F (36.6 °C)    SpO2: 95%    Weight: (!) 328 lb 1.6 oz (149 kg)    Height: 71\" (180.3 cm)      Physical Exam   Constitutional: He is oriented to person, place, and time. No distress.   HENT:   Head: Normocephalic and atraumatic.   Right Ear: External ear normal.   Left Ear: External ear normal.   Nose: Nose normal.   Mouth/Throat: Oropharynx is clear and moist.   Cerumen bilaterally   Eyes: Conjunctivae and EOM are normal. Pupils are equal, round, and reactive to light.   Neck: Normal range of motion. Neck supple. No tracheal deviation present. No thyromegaly present.   Cardiovascular: Normal rate, regular rhythm, normal heart sounds and intact distal pulses.  Exam reveals no gallop and no friction rub.    No murmur heard.  Pulmonary/Chest: Effort normal and breath sounds normal. No respiratory distress. He has no wheezes. He has no rales. He exhibits no tenderness.   Abdominal: Soft. Bowel sounds are normal. He exhibits no distension and no mass. There is no tenderness. There is no rebound and no guarding.   Musculoskeletal: Normal range of motion. He " exhibits no edema.   Neurological: He is alert and oriented to person, place, and time.   Skin: Skin is warm and dry. No rash noted. He is not diaphoretic. No erythema. No pallor.     Lab Review  none     Assessment/Plan     1. Essential hypertension    2. Decreased hearing of both ears    3. Morbid obesity         Plan:   Olivia was seen today for hypertension.    Diagnoses and all orders for this visit:    Essential hypertension  -     Blood Pressure Device  -     amLODIPine (NORVASC) 10 MG tablet; Take 1 tablet by mouth Daily.    Decreased hearing of both ears  -     Ambulatory Referral to Audiology    Morbid obesity        - Gained 10 pounds since last seen.  Recommended diet modification and exercise.    1.  Rx changes: will increase Norvasc to 10 mg, advised to buy home blood pressure device and check daily  2.  Education:    Presented an overview of HTN, expected course, considerations, risk factors, and exacerbation prevention.   Discussed treatment options for HTN: yes   Recommended restricted dietary Na intake: yes   Discussed patient action plan for HTN: yes  3.  Compliance at present is estimated to be good. Efforts to improve compliance (if necessary) will be directed at dietary modifications: increased fruits and veggies and increased exercise.    4.  Follow up: 1 months    GOALS:  Lose 4 lb by next appt  BARRIERS TO GOALS:  Insight    Preventative:  up to date and documented   Recommended:none.  Had flu shot at work.  Smoking cessation counseling was provided.  does not drink  eat more fruits and vegetables, increase water intake and increase physical activity    RISK SCORE: 4          This document has been electronically signed by Rahat Boone MD on November 28, 2017 10:51 PM

## 2017-11-29 NOTE — PROGRESS NOTES
I have reviewed the notes, assessments, and/or procedures performed by Rahat Boone MD, I concur with her/his documentation of Olivia Walden.         This document has been electronically signed by Ashley Hilario MD on November 29, 2017 5:01 PM

## 2018-01-10 ENCOUNTER — OFFICE VISIT (OUTPATIENT)
Dept: FAMILY MEDICINE CLINIC | Facility: CLINIC | Age: 38
End: 2018-01-10

## 2018-01-10 VITALS
WEIGHT: 315 LBS | HEART RATE: 89 BPM | DIASTOLIC BLOOD PRESSURE: 90 MMHG | SYSTOLIC BLOOD PRESSURE: 160 MMHG | BODY MASS INDEX: 44.1 KG/M2 | HEIGHT: 71 IN | OXYGEN SATURATION: 95 %

## 2018-01-10 DIAGNOSIS — I10 ESSENTIAL HYPERTENSION: Primary | ICD-10-CM

## 2018-01-10 DIAGNOSIS — R60.9 EDEMA, UNSPECIFIED TYPE: ICD-10-CM

## 2018-01-10 DIAGNOSIS — E66.01 MORBID OBESITY (HCC): ICD-10-CM

## 2018-01-10 PROCEDURE — 99213 OFFICE O/P EST LOW 20 MIN: CPT | Performed by: FAMILY MEDICINE

## 2018-01-10 RX ORDER — HYDROCHLOROTHIAZIDE 12.5 MG/1
12.5 TABLET ORAL DAILY
Qty: 30 TABLET | Refills: 3 | Status: SHIPPED | OUTPATIENT
Start: 2018-01-10 | End: 2018-12-29 | Stop reason: SDUPTHER

## 2018-01-10 NOTE — PROGRESS NOTES
Subjective:     Chief Complaint   Patient presents with   • Hypertension     Olivia Walden is a 37 y.o. male who presents for follow up of HTN    Evaluation:   Blood pressure reading not indicated for medication reasons: No   Blood pressure reading refused by patient: No   Home blood pressure readings: checks irregularly, states readings similar to clinic   Blood pressure often elevated in physician office: Yes, Grade I hypertension   Onset of symptoms: N/A  Symptoms:   Headache: no   Visual disturbance: no   Fatigue: no   Dyspnea: no   Orthopnea: no   Edema: yes, LLE   Chest pain: no   Palpitations: no   Diaphoresis: no    Risk Factors:   HLD, Sedentary lifestyle    Comorbid Conditions:   None    The following portions of the patient's history were reviewed and updated as appropriate: allergies, current medications, past family history, past medical history, past social history, past surgical history and problem list.    Preventative:  Over the past 2 weeks, have you felt down, depressed, or hopeless?No   Over the past 2 weeks, have you felt little interest or pleasure in doing things?No  Clinical depression screening refused by patient.No      PHQ-2/PHQ-9 Depression Screening 7/18/2017   Little interest or pleasure in doing things 1   Feeling down, depressed, or hopeless 0   Trouble falling or staying asleep, or sleeping too much 0   Feeling tired or having little energy 1   Poor appetite or overeating 0   Feeling bad about yourself - or that you are a failure or have let yourself or your family down 0   Trouble concentrating on things, such as reading the newspaper or watching television 0   Moving or speaking so slowly that other people could have noticed. Or the opposite - being so fidgety or restless that you have been moving around a lot more than usual 0   Thoughts that you would be better off dead, or of hurting yourself in some way 0   Total Score 2   If you checked off any problems, how  "difficult have these problems made it for you to do your work, take care of things at home, or get along with other people? Not difficult at all       On osteoporosis therapy?No     Past Medical Hx:  Past Medical History:   Diagnosis Date   • Depression     PHQ score 10, 4/11/16   • Exercise-induced asthma    • Pneumonia        Past Surgical Hx:  Past Surgical History:   Procedure Laterality Date   • TONSILLECTOMY  1988       Health Maintenance:  Health Maintenance   Topic Date Due   • TDAP/TD VACCINES (2 - Td) 04/11/2026   • INFLUENZA VACCINE  Addressed       Current Meds:    Current Outpatient Prescriptions:   •  albuterol (PROVENTIL HFA;VENTOLIN HFA) 108 (90 BASE) MCG/ACT inhaler, 2 puff(s) 15 minutes before exercise, Disp: 18 g, Rfl: 0  •  amLODIPine (NORVASC) 10 MG tablet, Take 1 tablet by mouth Daily., Disp: 30 tablet, Rfl: 3  •  hydrochlorothiazide (HYDRODIURIL) 12.5 MG tablet, Take 1 tablet by mouth Daily., Disp: 30 tablet, Rfl: 3    Allergies:  Review of patient's allergies indicates no known allergies.    Family Hx:  Family History   Problem Relation Age of Onset   • Diabetes Paternal Aunt    • Hypertension Neg Hx    • Pancreatic cancer Neg Hx    • Cancer Neg Hx         Social History:  Social History     Social History   • Marital status: Single     Spouse name: N/A   • Number of children: N/A   • Years of education: N/A     Occupational History   • Not on file.     Social History Main Topics   • Smoking status: Never Smoker   • Smokeless tobacco: Never Used   • Alcohol use Yes      Comment: social (1-2 drinks/week)   • Drug use: No   • Sexual activity: Defer     Other Topics Concern   • Not on file     Social History Narrative       Review of Systems  Review of Systems   Constitutional: Negative for activity change, appetite change, fatigue and fever.   HENT: Positive for ear pain (not \"pain\" but abnormal sensation) and hearing loss (difficulty hearing voices at times). Negative for sore throat.    Eyes: " "Negative for pain and visual disturbance.   Respiratory: Negative for cough and shortness of breath.    Cardiovascular: Positive for palpitations (intermittent palpitations \"strong heart beat\", 2-3 times a day, but not every day.  Does not want to purse work-up at this time) and leg swelling. Negative for chest pain.   Gastrointestinal: Negative for abdominal pain and nausea.   Endocrine: Negative for cold intolerance and heat intolerance.   Genitourinary: Negative for difficulty urinating and dysuria.   Musculoskeletal: Negative for arthralgias and gait problem.   Skin: Negative for color change and rash.   Neurological: Negative for dizziness, weakness and headaches.   Hematological: Negative for adenopathy. Does not bruise/bleed easily.   Psychiatric/Behavioral: Negative for agitation, confusion and sleep disturbance.     Objective:     Vitals:    01/10/18 1104   BP: 160/90   BP Location: Right arm   Patient Position: Sitting   Pulse: 89   SpO2: 95%   Weight: (!) 151 kg (333 lb)   Height: 180.3 cm (71\")     Physical Exam   Constitutional: He is oriented to person, place, and time. No distress.   HENT:   Head: Normocephalic and atraumatic.   Right Ear: External ear normal.   Left Ear: External ear normal.   Nose: Nose normal.   Mouth/Throat: Oropharynx is clear and moist.   Cerumen bilaterally   Eyes: Conjunctivae and EOM are normal. Pupils are equal, round, and reactive to light.   Neck: Normal range of motion. Neck supple. No tracheal deviation present. No thyromegaly present.   Cardiovascular: Normal rate, regular rhythm, normal heart sounds and intact distal pulses.  Exam reveals no gallop and no friction rub.    No murmur heard.  Pulmonary/Chest: Effort normal and breath sounds normal. No respiratory distress. He has no wheezes. He has no rales. He exhibits no tenderness.   Abdominal: Soft. Bowel sounds are normal. He exhibits no distension and no mass. There is no tenderness. There is no rebound and no " guarding.   Musculoskeletal: Normal range of motion. He exhibits edema (trace non-pitting LLE edema).   Neurological: He is alert and oriented to person, place, and time.   Skin: Skin is warm and dry. No rash noted. He is not diaphoretic. No erythema. No pallor.     Lab Review  none     Assessment/Plan     Olivia was seen today for hypertension.    Diagnoses and all orders for this visit:    Essential hypertension  -     Add hydrochlorothiazide (HYDRODIURIL) 12.5 MG tablet; Take 1 tablet by mouth Daily.    Edema, unspecified type  -     hydrochlorothiazide (HYDRODIURIL) 12.5 MG tablet; Take 1 tablet by mouth Daily.    Morbid obesity        - Gained 5 lb since last seen.  Recommended diet modification and exercise.    1.  Rx changes: will start hydrochlorthiazide  2.  Education:    Presented an overview of HTN, expected course, considerations, risk factors, and exacerbation prevention.   Discussed treatment options for HTN: yes   Recommended restricted dietary Na intake: yes   Discussed patient action plan for HTN: yes  3.  Compliance at present is estimated to be good. Efforts to improve compliance (if necessary) will be directed at dietary modifications: increased fruits and veggies and increased exercise.    4.  Follow up: 1 months    Goals        Patient Stated    • lose 10 lbs by follow-up (pt-stated)            Barriers:  none            Preventative:  up to date and documented   Recommended:none.  Had flu shot at work.  Smoking cessation counseling was provided.  does not drink  eat more fruits and vegetables, increase water intake and increase physical activity    RISK SCORE: 4          This document has been electronically signed by Rahat Boone MD on January 13, 2018 1:42 PM

## 2018-09-26 ENCOUNTER — OFFICE VISIT (OUTPATIENT)
Dept: FAMILY MEDICINE CLINIC | Facility: CLINIC | Age: 38
End: 2018-09-26

## 2018-09-26 VITALS
SYSTOLIC BLOOD PRESSURE: 140 MMHG | HEART RATE: 98 BPM | WEIGHT: 301.06 LBS | OXYGEN SATURATION: 95 % | HEIGHT: 71 IN | BODY MASS INDEX: 42.15 KG/M2 | DIASTOLIC BLOOD PRESSURE: 72 MMHG

## 2018-09-26 DIAGNOSIS — M25.562 ACUTE PAIN OF LEFT KNEE: Primary | ICD-10-CM

## 2018-09-26 PROCEDURE — 99213 OFFICE O/P EST LOW 20 MIN: CPT | Performed by: STUDENT IN AN ORGANIZED HEALTH CARE EDUCATION/TRAINING PROGRAM

## 2018-09-27 NOTE — PROGRESS NOTES
Family Medicine Residency   Helen Jamil MD    Olivia Walden is a 38 y.o. male who presents to family medicine residency clinic for the following:    PROBLEM LIST:  1. Essential HTN  2. Exercise induced asthma  3. Obesity  4. Plantar Fascitis  5. Depression    Possible Hernia  He states he has a place in the middle of his abdomen that sticks out with increased pressure. He does not know how long it has been there, and it does not cause him any pain    Knee Pain  He has left knee pain that has gotten progressively worse over the past 3 months. No specific history of trauma, but he does play football. He has not had any imaging done. The pain is tolerable when he is moving around. It is worse when he first stands up. He describes the pain in the back of his knee.     Toe Injury/Removal  On his right big toe, he lost most of his toe nail in a football injury. No drainage. He states it appears more yellow. The injury happened several months ago.     Hypertension  He has hypertension. He has HCTZ and Norvasc prescribed. He is not taking any of the medications. He stated Norvasc made him feel crazy. He has lost weight, about 32 pounds since January. He denies any dizziness or headaches.         Past Medical Hx:   Past Medical History:   Diagnosis Date   • Depression     PHQ score 10, 4/11/16   • Exercise-induced asthma    • Pneumonia        Past Surgical Hx:  Past Surgical History:   Procedure Laterality Date   • TONSILLECTOMY  1988       Current Meds:    Current Outpatient Prescriptions:   •  albuterol (PROVENTIL HFA;VENTOLIN HFA) 108 (90 BASE) MCG/ACT inhaler, 2 puff(s) 15 minutes before exercise, Disp: 18 g, Rfl: 0  •  hydrochlorothiazide (HYDRODIURIL) 12.5 MG tablet, Take 1 tablet by mouth Daily., Disp: 30 tablet, Rfl: 3    Allergies:  Patient has no known allergies.    Family Hx:  Family History   Problem Relation Age of Onset   • Diabetes Paternal Aunt    • Hypertension Neg Hx    •  Pancreatic cancer Neg Hx    • Cancer Neg Hx         Social History:  Social History     Social History   • Marital status: Single     Spouse name: N/A   • Number of children: N/A   • Years of education: N/A     Occupational History   • Not on file.     Social History Main Topics   • Smoking status: Never Smoker   • Smokeless tobacco: Never Used   • Alcohol use Yes      Comment: social (1-2 drinks/week)   • Drug use: No   • Sexual activity: Defer     Other Topics Concern   • Not on file     Social History Narrative   • No narrative on file       Review of Systems  Review of Systems   Constitutional: Negative for chills, diaphoresis and fever.   HENT: Negative for sneezing and sore throat.    Eyes: Negative for pain and discharge.   Respiratory: Negative for cough and shortness of breath.    Gastrointestinal: Negative for constipation, diarrhea, nausea and vomiting.   Endocrine: Negative for cold intolerance and heat intolerance.   Genitourinary: Negative for difficulty urinating, dysuria, frequency and urgency.   Musculoskeletal: Positive for arthralgias. Negative for myalgias.   Skin: Negative for color change and pallor.   Allergic/Immunologic: Negative for environmental allergies and food allergies.   Neurological: Negative for dizziness, syncope and weakness.   Psychiatric/Behavioral: Negative for confusion and sleep disturbance.       Physical Exam:  Vitals:    09/26/18 1542   BP: 140/72   Pulse: 98   SpO2: 95%       Body mass index is 41.99 kg/m².   Physical Exam   Constitutional: He is oriented to person, place, and time. He appears well-developed and well-nourished. No distress.   HENT:   Head: Normocephalic and atraumatic.   Nose: Nose normal.   Eyes: Conjunctivae and EOM are normal.   Neck: Normal range of motion. Neck supple.   Cardiovascular: Normal rate, regular rhythm and normal heart sounds.    No murmur heard.  Pulmonary/Chest: Effort normal and breath sounds normal. No respiratory distress. He has no  wheezes. He has no rales.   Abdominal: Soft. Bowel sounds are normal. He exhibits no distension. There is no tenderness. There is no guarding.   No defect appreciated on exam. No tenderness to palpation.    Musculoskeletal: Normal range of motion.   Normal active and passive ROM on left knee. No tenderness on palpation.    Neurological: He is alert and oriented to person, place, and time.   Skin: Skin is warm and dry.   Right big toe shows the nail bed removed. There is some nail left distally. No erythema. No warmth. No drainage.    Psychiatric: He has a normal mood and affect. His behavior is normal.   Vitals reviewed.        Data Reviewed:     LABS:   None    Assessment/Plan     Possible Hernia  -likely Diastasis Recti based on physical exam- continue to monitor. If sudden increase in pain, will do CT scan to rule out hernia.     Knee Pain  -refer to physical therapy  -refer to orthopedic surgery for evaluation     Toe Injury/Removal  -no signs of infection  -continue to monitor    Hypertension  -stop Norvasc  -start taking HCTZ  -encouraged compliance    Weight  -Class: Obese Class III extreme obesity: > or equal to 40kg/m2  -Patient's Body mass index is 41.99 kg/m². BMI is above normal parameters. Recommendations include: exercise counseling and nutrition counseling.      Nicotine status  reports that he has never smoked. He has never used smokeless tobacco.      Alcohol use:  reports that he drinks alcohol.    Health Maintenance  Health Maintenance   Topic Date Due   • ANNUAL PHYSICAL  07/06/1985   • INFLUENZA VACCINE  08/01/2018   • TDAP/TD VACCINES (2 - Td) 04/11/2026       FOLLOW-UP  Return in about 3 months (around 12/26/2018) for Recheck.      ORDERS  Medications Discontinued During This Encounter   Medication Reason   • amLODIPine (NORVASC) 10 MG tablet      Olivia was seen today for hypertension.    Diagnoses and all orders for this visit:    Acute pain of left knee  -     Ambulatory Referral to  Orthopedic Surgery  -     Ambulatory Referral to Physical Therapy              This document has been electronically signed by Helen Jamil MD on September 27, 2018 11:31 AM

## 2018-10-11 DIAGNOSIS — M25.562 LEFT KNEE PAIN, UNSPECIFIED CHRONICITY: Primary | ICD-10-CM

## 2018-10-22 ENCOUNTER — OFFICE VISIT (OUTPATIENT)
Dept: ORTHOPEDIC SURGERY | Facility: CLINIC | Age: 38
End: 2018-10-22

## 2018-10-22 VITALS — BODY MASS INDEX: 42.42 KG/M2 | WEIGHT: 303 LBS | HEIGHT: 71 IN

## 2018-10-22 DIAGNOSIS — M25.562 CHRONIC PAIN OF LEFT KNEE: Primary | ICD-10-CM

## 2018-10-22 DIAGNOSIS — M17.12 PRIMARY OSTEOARTHRITIS OF LEFT KNEE: ICD-10-CM

## 2018-10-22 DIAGNOSIS — G89.29 CHRONIC PAIN OF LEFT KNEE: Primary | ICD-10-CM

## 2018-10-22 PROCEDURE — 99203 OFFICE O/P NEW LOW 30 MIN: CPT | Performed by: ORTHOPAEDIC SURGERY

## 2018-10-22 RX ORDER — MELOXICAM 15 MG/1
15 TABLET ORAL DAILY
Qty: 30 TABLET | Refills: 3 | Status: SHIPPED | OUTPATIENT
Start: 2018-10-22 | End: 2019-04-12 | Stop reason: SDUPTHER

## 2018-10-22 NOTE — PROGRESS NOTES
Olivia Walden is a 38 y.o. male   Primary provider:  Helen Jamil MD       Chief Complaint   Patient presents with   • Left Knee - Pain       HISTORY OF PRESENT ILLNESS: Patient is here today for left knee pain. Patient was sent to [facility] upon arrival. He states that his pain is mainly when he is getting up and down. It has been giving him pain for approximately 1 year.  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  He continued to play the game and continues to play.  It is worse after he is been sitting for a while is better when he is active.  No catching buckling or giving way.  No history of opposite knee pain.    History of Present Illness     CONCURRENT MEDICAL HISTORY:    Past Medical History:   Diagnosis Date   • Depression     PHQ score 10, 4/11/16   • Exercise-induced asthma    • Pneumonia        No Known Allergies      Current Outpatient Prescriptions:   •  albuterol (PROVENTIL HFA;VENTOLIN HFA) 108 (90 BASE) MCG/ACT inhaler, 2 puff(s) 15 minutes before exercise, Disp: 18 g, Rfl: 0  •  hydrochlorothiazide (HYDRODIURIL) 12.5 MG tablet, Take 1 tablet by mouth Daily., Disp: 30 tablet, Rfl: 3  •  meloxicam (MOBIC) 15 MG tablet, Take 1 tablet by mouth Daily. Take with meal daily, Disp: 30 tablet, Rfl: 3    Past Surgical History:   Procedure Laterality Date   • TONSILLECTOMY  1988       Family History   Problem Relation Age of Onset   • Diabetes Paternal Aunt    • Hypertension Neg Hx    • Pancreatic cancer Neg Hx    • Cancer Neg Hx        Social History     Social History   • Marital status: Single     Spouse name: N/A   • Number of children: N/A   • Years of education: N/A     Occupational History   • Not on file.     Social History Main Topics   • Smoking status: Never Smoker   • Smokeless tobacco: Never Used   • Alcohol use Yes      Comment: social (1-2 drinks/week)   • Drug use: No   • Sexual activity: Defer     Other Topics Concern   • Not on file     Social History  "Narrative   • No narrative on file        Review of Systems   Constitutional: Negative for chills and fever.   HENT: Negative for facial swelling.    Eyes: Negative for photophobia.   Respiratory: Negative for apnea and shortness of breath.    Cardiovascular: Negative for chest pain and leg swelling.   Gastrointestinal: Negative for abdominal pain, nausea and vomiting.   Genitourinary: Negative for dysuria.   Musculoskeletal: Positive for gait problem.   Skin: Negative for color change and rash.   Neurological: Negative for seizures and syncope.   Psychiatric/Behavioral: Negative for behavioral problems and dysphoric mood.       PHYSICAL EXAMINATION:       Ht 180.3 cm (71\")   Wt (!) 137 kg (303 lb)   BMI 42.26 kg/m²     Physical Exam   Constitutional: He is oriented to person, place, and time. He appears well-developed and well-nourished.   HENT:   Head: Normocephalic and atraumatic.   Eyes: Pupils are equal, round, and reactive to light. EOM are normal.   Neck: Neck supple. No tracheal deviation present.   Pulmonary/Chest: Effort normal.   Musculoskeletal: Normal range of motion. He exhibits no edema, tenderness or deformity.   Neurological: He is alert and oriented to person, place, and time.   Skin: Skin is warm and dry. No erythema.   Psychiatric: He has a normal mood and affect.   Vitals reviewed.      GAIT:     [x]  Normal  []  Antalgic    Assistive device: [x]  None  []  Walker     []  Crutches  []  Cane     []  Wheelchair  []  Stretcher    Ortho Exam   Good motion of the hip without pain.  Good motion N/A.  No effusion.  Ligaments are stable.  Lachman is negative negative.  No medial or lateral instability.  The calf is negative.  Neurovascularly intact distally.       Standing AP x-rays show medial joint space narrowing left worse than right lateral view is intact  No results found.        ASSESSMENT:    Diagnoses and all orders for this visit:    Chronic pain of left knee  -     meloxicam (MOBIC) 15 MG " tablet; Take 1 tablet by mouth Daily. Take with meal daily  -     MRI Knee Left Without Contrast; Future    Primary osteoarthritis of left knee          PLAN we'll put him on some meloxicam at this point to see if that helps him.  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  I've gone over his x-rays with him and showed of the picture.  I does have narrowing here and indeed he likely has a degenerative meniscal tear was made inject upon seeing him back depending upon the findings.    No Follow-up on file.    German Bender MD

## 2018-12-29 DIAGNOSIS — I10 ESSENTIAL HYPERTENSION: ICD-10-CM

## 2018-12-29 DIAGNOSIS — R60.9 EDEMA, UNSPECIFIED TYPE: ICD-10-CM

## 2018-12-31 RX ORDER — HYDROCHLOROTHIAZIDE 12.5 MG/1
TABLET ORAL
Qty: 30 TABLET | Refills: 3 | Status: SHIPPED | OUTPATIENT
Start: 2018-12-31 | End: 2019-04-12 | Stop reason: SDUPTHER

## 2019-04-12 ENCOUNTER — OFFICE VISIT (OUTPATIENT)
Dept: FAMILY MEDICINE CLINIC | Facility: CLINIC | Age: 39
End: 2019-04-12

## 2019-04-12 VITALS
OXYGEN SATURATION: 96 % | BODY MASS INDEX: 44.1 KG/M2 | SYSTOLIC BLOOD PRESSURE: 140 MMHG | HEART RATE: 102 BPM | WEIGHT: 315 LBS | HEIGHT: 71 IN | DIASTOLIC BLOOD PRESSURE: 82 MMHG

## 2019-04-12 DIAGNOSIS — J45.990 EXERCISE-INDUCED ASTHMA: ICD-10-CM

## 2019-04-12 DIAGNOSIS — G89.29 CHRONIC PAIN OF LEFT KNEE: ICD-10-CM

## 2019-04-12 DIAGNOSIS — G56.32 ACUTE RADIAL NERVE PALSY OF LEFT UPPER EXTREMITY: ICD-10-CM

## 2019-04-12 DIAGNOSIS — I10 ESSENTIAL HYPERTENSION: Primary | ICD-10-CM

## 2019-04-12 DIAGNOSIS — M25.562 CHRONIC PAIN OF LEFT KNEE: ICD-10-CM

## 2019-04-12 DIAGNOSIS — R60.9 EDEMA, UNSPECIFIED TYPE: ICD-10-CM

## 2019-04-12 PROCEDURE — 99213 OFFICE O/P EST LOW 20 MIN: CPT | Performed by: STUDENT IN AN ORGANIZED HEALTH CARE EDUCATION/TRAINING PROGRAM

## 2019-04-12 RX ORDER — HYDROCHLOROTHIAZIDE 12.5 MG/1
12.5 TABLET ORAL DAILY
Qty: 30 TABLET | Refills: 3 | Status: SHIPPED | OUTPATIENT
Start: 2019-04-12 | End: 2022-06-03

## 2019-04-12 RX ORDER — MELOXICAM 15 MG/1
15 TABLET ORAL DAILY
Qty: 30 TABLET | Refills: 3 | Status: SHIPPED | OUTPATIENT
Start: 2019-04-12 | End: 2021-02-02 | Stop reason: SDUPTHER

## 2019-04-12 RX ORDER — ALBUTEROL SULFATE 90 UG/1
AEROSOL, METERED RESPIRATORY (INHALATION)
Qty: 18 G | Refills: 0 | Status: SHIPPED | OUTPATIENT
Start: 2019-04-12

## 2019-05-14 NOTE — PROGRESS NOTES
FAMILY MEDICINE RESIDENCY CLINIC  PROGRESS NOTE  Renaldo Loera Jr, MD  Subjective   SUBJECTIVE:   CC: Knee Pain (3 mo follow up..Lt patient says his knee still bothers him) and Hypertension (3 mo follow up)   Olivia Walden is a 38 y.o. male who presents to the Family Medicine Residency Clinic today for     Hypertension  Patient is here for follow-up of elevated blood pressure. He is exercising and is not adherent to a low-salt diet. Blood pressure is well controlled at home. Cardiac symptoms: none. Patient denies chest pain, cough, fatigue and near-syncope. Cardiovascular risk factors: hypertension and male gender. Use of agents associated with hypertension: none. History of target organ damage: none.    I have reviewed the patient's medical, family, and social history in detail;there are no changes to the history as noted in the electronic medical record.   Past Medical History:   Diagnosis Date   • Depression     PHQ score 10, 4/11/16   • Exercise-induced asthma    • Pneumonia      Past Surgical History:   Procedure Laterality Date   • TONSILLECTOMY  1988     No current outpatient medications on file prior to visit.     No current facility-administered medications on file prior to visit.      He has No Known Allergies.    Family History   Problem Relation Age of Onset   • Diabetes Paternal Aunt    • Hypertension Neg Hx    • Pancreatic cancer Neg Hx    • Cancer Neg Hx         He  reports that he has never smoked. He has never used smokeless tobacco. He reports that he drinks alcohol. He reports that he does not use drugs.    Review of Systems General: negative for - fatigue or weight loss  PULM: no cough, shortness of breath, or wheezing  CV: no chest pain or dyspnea on exertion  MSK: Negative for gait disturbance, joint pain, joint swelling or muscular weakness  GI: no abdominal pain, change in bowel habits, or black or bloody stools  NEURO: no TIA or stroke symptoms; No confusion, dizziness, HA or  "seizures        Objective   OBJECTIVE:     Vitals:    04/12/19 1521   BP: 140/82   Pulse: 102   SpO2: 96%   Weight: (!) 144 kg (318 lb 6.4 oz)   Height: 180.3 cm (71\")     Physical Exam GEN: Well developed, in no acute distress  HEENT: NCAT, without lesions or deformities  CV: Normal S1/S2, no murmurs or friction rubs  PULM: Normal effort, without wheezes or rhonchi  GI: Soft, non-tender & non-distended; +BSx4  Ext: Full ROM, without edema or deformities  Neuro: AxO x 3, normal gait  Psych: appropriate mood & affect    DATA REVIEWED:  No visits with results within 3 Month(s) from this visit.   Latest known visit with results is:   No results found for any previous visit.     Assessment/Plan   ASSESSMENT & PLAN:      Diagnosis Plan   1. Essential hypertension  hydrochlorothiazide (HYDRODIURIL) 12.5 MG tablet   2. Chronic pain of left knee  Ambulatory Referral to Physical Therapy    meloxicam (MOBIC) 15 MG tablet   3. Acute radial nerve palsy of left upper extremity   Wrist Hand Orthosis, Wrist Extension Control Cock-up   4. Exercise-induced asthma  albuterol sulfate  (90 Base) MCG/ACT inhaler   5. Edema, unspecified type  hydrochlorothiazide (HYDRODIURIL) 12.5 MG tablet     Orders Placed This Encounter   Procedures   •  Wrist Hand Orthosis, Wrist Extension Control Cock-up     Order Specific Question:   Laterality     Answer:   Left     Order Specific Question:   Length of Need (99 Months = Lifetime)     Answer:   99 Months = Lifetime   • Ambulatory Referral to Physical Therapy     Referral Priority:   Routine     Referral Type:   Therapy     Referral Reason:   Specialty Services Required     Requested Specialty:   Physical Therapy     Number of Visits Requested:   1     Medications Discontinued During This Encounter   Medication Reason   • albuterol (PROVENTIL HFA;VENTOLIN HFA) 108 (90 BASE) MCG/ACT inhaler Reorder   • meloxicam (MOBIC) 15 MG tablet Reorder   • hydrochlorothiazide (HYDRODIURIL) 12.5 " MG tablet Reorder     -- Preventive Medicine Topics --   The patient has no Health Maintenance topics of status Overdue, Due On, or Due Soon  Patient's Body mass index is 44.41 kg/m². BMI is above normal parameters. Recommendations include: educational material and nutrition counseling.    Mr. Walden   reports that he has never smoked. He has never used smokeless tobacco.    Goals Addressed     None           Follow-up:   No Follow-up on file.  RISK SCORE: 3  ___________________________  Renaldo Loera Jr., M.D. PGY2  Family Practice Resident  [address] [place] [address]  Phone: [phone]  Fax: [phone]  ¯¯¯¯¯¯¯¯¯¯¯¯¯¯¯¯¯¯¯¯¯¯¯¯¯¯¯    This document has been electronically signed by  Renaldo Loera Jr, MD on 05/14/19 2:57 PM

## 2019-05-16 ENCOUNTER — OFFICE VISIT (OUTPATIENT)
Dept: FAMILY MEDICINE CLINIC | Facility: CLINIC | Age: 39
End: 2019-05-16

## 2019-05-16 VITALS — OXYGEN SATURATION: 99 % | BODY MASS INDEX: 44.1 KG/M2 | HEIGHT: 71 IN | HEART RATE: 98 BPM | WEIGHT: 315 LBS

## 2019-05-16 DIAGNOSIS — M25.562 CHRONIC PAIN OF LEFT KNEE: Primary | ICD-10-CM

## 2019-05-16 DIAGNOSIS — S82.201A CLOSED FRACTURE OF RIGHT TIBIA AND FIBULA, INITIAL ENCOUNTER: ICD-10-CM

## 2019-05-16 DIAGNOSIS — G89.29 CHRONIC PAIN OF LEFT KNEE: Primary | ICD-10-CM

## 2019-05-16 DIAGNOSIS — S82.401A CLOSED FRACTURE OF RIGHT TIBIA AND FIBULA, INITIAL ENCOUNTER: ICD-10-CM

## 2019-05-16 PROCEDURE — 99213 OFFICE O/P EST LOW 20 MIN: CPT | Performed by: STUDENT IN AN ORGANIZED HEALTH CARE EDUCATION/TRAINING PROGRAM

## 2019-05-16 NOTE — PROGRESS NOTES
"     Family Medicine Residency   Helen Jamil MD    Olivia Walden is a 38 y.o. male who presents to family medicine residency clinic for the following:    He is here for follow-up for left knee pain.  He was seen by Dr. Loera on April 12.  He was referred to physical therapy at that time.  He did not go to physical therapy.  He states that his left knee is improved since last visit.      He also had a recent right tibia and fibula fracture.  X-ray showed \"there is a displaced spiral comminuted fracture through the mid diaphysis of the right fibula with 1.1 cm displacement between the 2 main fracture fragments of the right fibula. There is presence of a nondisplaced spiral fracture through the proximal diaphysis of the right tibia and a displaced fracture through the mid diaphysis of the right tibia with 8 mm displacement of the 2 fracture fragments. In addition there is presence of at least 2 discrete incomplete cortical fractures in the right tibia between the 2 main fractures of the right tibia as described above.\"    He is seen by [facility].  His pain is controlled.  He is being followed up for that injury.  He has full range of motion.  He does have some lower extremity swelling.      Past Medical Hx:   Past Medical History:   Diagnosis Date   • Depression     PHQ score 10, 4/11/16   • Exercise-induced asthma    • Pneumonia        Past Surgical Hx:  Past Surgical History:   Procedure Laterality Date   • TONSILLECTOMY  1988       Current Meds:    Current Outpatient Medications:   •  albuterol sulfate  (90 Base) MCG/ACT inhaler, 2 puff(s) 15 minutes before exercise, Disp: 18 g, Rfl: 0  •  hydrochlorothiazide (HYDRODIURIL) 12.5 MG tablet, Take 1 tablet by mouth Daily., Disp: 30 tablet, Rfl: 3  •  meloxicam (MOBIC) 15 MG tablet, Take 1 tablet by mouth Daily. Take with meal daily, Disp: 30 tablet, Rfl: 3    Allergies:  Patient has no known allergies.    Family Hx:  Family History "   Problem Relation Age of Onset   • Diabetes Paternal Aunt    • Hypertension Neg Hx    • Pancreatic cancer Neg Hx    • Cancer Neg Hx         Social History:  Social History     Socioeconomic History   • Marital status: Single     Spouse name: Not on file   • Number of children: Not on file   • Years of education: Not on file   • Highest education level: Not on file   Tobacco Use   • Smoking status: Never Smoker   • Smokeless tobacco: Never Used   Substance and Sexual Activity   • Alcohol use: Yes     Comment: social (1-2 drinks/week)   • Drug use: No   • Sexual activity: Defer       Review of Systems  Review of Systems   Constitutional: Negative for chills, diaphoresis and fever.   HENT: Negative for sneezing and sore throat.    Eyes: Negative for pain and discharge.   Respiratory: Negative for cough and shortness of breath.    Gastrointestinal: Negative for constipation, diarrhea, nausea and vomiting.   Endocrine: Negative for cold intolerance and heat intolerance.   Genitourinary: Negative for difficulty urinating, dysuria, frequency and urgency.   Musculoskeletal: Negative for arthralgias and myalgias.   Skin: Negative for color change and pallor.   Allergic/Immunologic: Negative for environmental allergies and food allergies.   Neurological: Negative for dizziness, syncope and weakness.   Psychiatric/Behavioral: Negative for confusion and sleep disturbance.       Physical Exam:  Vitals:    05/16/19 1515   Pulse: 98   SpO2: 99%       Body mass index is 44.35 kg/m².   Physical Exam   Constitutional: He is oriented to person, place, and time. He appears well-developed and well-nourished. No distress.   HENT:   Head: Normocephalic and atraumatic.   Nose: Nose normal.   Eyes: Conjunctivae and EOM are normal.   Neck: Normal range of motion. Neck supple.   Cardiovascular: Normal rate, regular rhythm and normal heart sounds.   No murmur heard.  Pulmonary/Chest: Effort normal and breath sounds normal. No respiratory  distress. He has no wheezes. He has no rales.   Abdominal: Soft. Bowel sounds are normal. He exhibits no distension. There is no tenderness. There is no guarding.   Musculoskeletal: Normal range of motion. He exhibits edema and tenderness.   Dorsalis pedis pulse is 2+.  He does have some lower extremity swelling on the right side.  Full range of motion.  Incision is clean dry and intact   Neurological: He is alert and oriented to person, place, and time.   Skin: Skin is warm and dry.   Psychiatric: He has a normal mood and affect. His behavior is normal.   Vitals reviewed.        Data Reviewed:     LABS:   No results found for: GLUCOSE, BUN, CREATININE, EGFRIFNONA, EGFRIFAFRI, BCR, K, CO2, CALCIUM, PROTENTOTREF, ALBUMIN, LABIL2, AST, ALT  Lab Results   Component Value Date    WBC 7.1 04/20/2019    HGB 14.4 04/20/2019    HCT 42.7 04/20/2019    MCV 79.0 (L) 04/20/2019     04/20/2019     No results found for: CHOL, CHLPL, TRIG, HDL, LDL, LDLDIRECT  No results found for: TSH, S3BTPRP, C6YXJIF, THYROIDAB  No results found for: HGBA1C  No results found for: GLUF, MICROALBUR, CREATININE  No results found for: IRON, TIBC, FERRITIN    Assessment/Plan       Knee pain  -His left knee pain has improved.  Will refer to physical therapy again if he has recurrence of this pain.  -Since last visit he had a fracture of his right leg.  He is seen at [facility].  We will continue management by orthopedics.  Pain is controlled.  Incision is clean.  No fevers, no chills.  He has a follow-up appointment with them.    Health Maintenance  Health Maintenance   Topic Date Due   • INFLUENZA VACCINE  08/01/2019   • ANNUAL PHYSICAL  09/28/2019   • TDAP/TD VACCINES (2 - Td) 04/11/2026       FOLLOW-UP  Return in about 6 months (around 11/16/2019), or if symptoms worsen or fail to improve.      ORDERS  There are no discontinued medications.  Olivia was seen today for leg pain and knee pain.    Diagnoses and all orders for this  visit:    Chronic pain of left knee    Closed fracture of right tibia and fibula, initial encounter        Goals:   Goals        Patient Stated    • lose 10 lbs by follow-up (pt-stated)      Barriers:  none            RISK SCORE: 3        This document has been electronically signed by Helen Jamil MD on May 16, 2019 3:44 PM

## 2019-05-16 NOTE — PROGRESS NOTES
I have reviewed the notes, assessments, and/or procedures performed by Helen Jamil MD, I concur with her/his documentation and assessment and plan for on Ambar Walden.                This document has been electronically signed by Brady Vasquez MD on May 16, 2019 4:53 PM

## 2019-05-17 NOTE — PROGRESS NOTES
I have reviewed the patient.  I have reviewed the notes, assessments, and/or procedures performed by Dr Renaldo Loera Jr, I concur with his  documentation and assessment and plan for Olivia Mccallkins.          This document has been electronically signed by Roel Mcfarlane MD on May 17, 2019 10:25 AM

## 2019-07-01 ENCOUNTER — TRANSCRIBE ORDERS (OUTPATIENT)
Dept: PHYSICAL THERAPY | Facility: HOSPITAL | Age: 39
End: 2019-07-01

## 2019-07-01 DIAGNOSIS — S82.141A DISPLACED BICONDYLAR FRACTURE OF RIGHT TIBIA, INITIAL ENCOUNTER FOR CLOSED FRACTURE: Primary | ICD-10-CM

## 2019-07-10 ENCOUNTER — HOSPITAL ENCOUNTER (OUTPATIENT)
Dept: PHYSICAL THERAPY | Facility: HOSPITAL | Age: 39
Setting detail: THERAPIES SERIES
Discharge: HOME | End: 2019-07-10

## 2019-07-10 DIAGNOSIS — S82.201S TIBIA/FIBULA FRACTURE, RIGHT, SEQUELA: Primary | ICD-10-CM

## 2019-07-10 DIAGNOSIS — S82.401S TIBIA/FIBULA FRACTURE, RIGHT, SEQUELA: Primary | ICD-10-CM

## 2019-07-10 PROCEDURE — 97162 PT EVAL MOD COMPLEX 30 MIN: CPT | Performed by: PHYSICAL THERAPIST

## 2019-07-10 PROCEDURE — 97110 THERAPEUTIC EXERCISES: CPT | Performed by: PHYSICAL THERAPIST

## 2019-07-10 NOTE — THERAPY EVALUATION
Outpatient Physical Therapy Ortho Initial Evaluation  [facility]  Shakira Wyatt, PT, DPT, CSCS       Patient Name: Olivia Walden  : 1980  MRN: [id number]  Today's Date: 7/10/2019      Visit Date: 07/10/2019     Pt reports 3/10 pain pre treatment, 2/10 pain post treatment  Reports N/A% of improvement.  Attended  visits.  Insurance available: medical necessity  Next MD appt: 2019.  Recertification: 2019.    Patient Active Problem List   Diagnosis   • Essential hypertension   • Morbid obesity (CMS/HCC)   • Exercise-induced asthma   • Excessive cerumen in left ear canal   • Major depressive disorder with single episode, in full remission (CMS/HCC)   • Ankle sprain   • Atypical Plantar fasciitis   • Decreased hearing of both ears   • Edema   • Chronic pain of left knee   • Primary osteoarthritis of left knee   • Acute radial nerve palsy of left upper extremity        Past Medical History:   Diagnosis Date   • Depression     PHQ score 10, 16   • Hypertension    • Pneumonia         Past Surgical History:   Procedure Laterality Date   • FRACTURE SURGERY Right 2019    ORIF of R tibia   • TONSILLECTOMY         Visit Dx:     ICD-10-CM ICD-9-CM   1. Tibia/fibula fracture, right, sequela S82.201S 905.4    S82.401S      Number of days off work: Off since the accident    Patient is .    Medications: water pill    Allergies: None    Patient History     Row Name 07/10/19 1400             History    Chief Complaint  Pain  -AJ      Type of Pain  Lower Extremity / Leg  -      Date Current Problem(s) Began  19  -      Brief Description of Current Complaint  Patient reports he was playing football and he was tackled to the leg and it broke  -      Previous treatment for THIS PROBLEM  Medication;Surgery  -      Surgery Date:  19  -      Patient/Caregiver Goals  Relieve pain;Return to prior level of function  -AJ      Current Tobacco  Use  None  -AJ      Smoking Status  Non-smoker  -AJ      Patient's Rating of General Health  Good  -AJ      Occupation/sports/leisure activities  Occupation: [employer] in [employer] Hobbies: drawing, play games, football  -AJ      Patient seeing anyone else for problem(s)?  Yes, Ortho  -AJ      What clinical tests have you had for this problem?  X-ray  -AJ      Results of Clinical Tests  Healing well  -AJ      History of Previous Related Injuries  None prior to this  -AJ         Pain     Pain Location  Leg  -AJ      Pain at Present  3  -AJ      Pain at Best  2  -AJ      Pain at Worst  5  -AJ      Pain Frequency  Constant/continuous  -AJ      Pain Description  Sore  -AJ      What Performance Factors Make the Current Problem(s) WORSE?  None  -AJ      What Performance Factors Make the Current Problem(s) BETTER?  Ice  -AJ      Tolerance Time- Standing  feels okay  -AJ      Tolerance Time- Walking  <10 minutes  -AJ      Is your sleep disturbed?  No  -AJ      Is medication used to assist with sleep?  No  -AJ      Difficulties at work?  Off since injury  -AJ      Difficulties with ADL's?  None  -AJ      Difficulties with recreational activities?  football  -AJ        User Key  (r) = Recorded By, (t) = Taken By, (c) = Cosigned By    Initials Name Provider Type    AJ Shakira Wyatt, PT DPT Physical Therapist          PT Ortho     Row Name 07/10/19 1400       Subjective Comments    Subjective Comments  Patient wishes to get it back like it was before he broke it.  -AJ       Precautions and Contraindications    Precautions/Limitations  no known precautions/limitations  -AJ       Subjective Pain    Able to rate subjective pain?  yes  -AJ    Pre-Treatment Pain Level  3  -AJ    Post-Treatment Pain Level  2  -AJ       Posture/Observations    Posture- WNL  Posture is WNL for B LE  -AJ    Observations  Edema  -AJ    Posture/Observations Comments  No distress, ambulates in with 1 axillary crutch on the L, wearing B slide sandals.   -AJ       Special Tests/Palpation    Special Tests/Palpation  -- Mild R lateral knee TTP.  -AJ       Right Lower Ext    Rt Knee Extension/Flexion AROM  0°-85°  -AJ    Rt Ankle Dorsiflexion AROM  -2°  -AJ    Rt Ankle Plantarflexion AROM  52°  -AJ    Rt Ankle Inversion AROM  32°  -AJ    Rt Ankle Eversion AROM  4°  -AJ       Left Lower Ext    Lt Knee Extension/Flexion AROM  0°-115°  -AJ    Lt Ankle Dorsiflexion AROM  8°  -AJ    Lt Ankle Plantarflexion AROM  52°  -AJ    Lt Ankle Inversion AROM  30°  -AJ    Lt Ankle Eversion AROM  30°  -AJ       MMT (Manual Muscle Testing)    General MMT Comments  B ankles 5/5. R QS 4+/5, R HS 4/5, R hip fexion 4+/5, L LE 5/5.  -AJ       Sensation    Sensation WNL?  WNL  -AJ    Light Touch  No apparent deficits  -AJ    Additional Comments  Denies any numbness or tingling.  -AJ       Lower Extremity Flexibility    Hamstrings  Bilateral:;Mildly limited;Moderately limited  -AJ    Gastrocnemius  Bilateral:;Mildly limited  -AJ    Soleus  Bilateral:;Mildly limited;Moderately limited  -AJ       RLE Quick Girth (cm)    Largest calf  52.1 cm  -AJ       LLE Quick Girth (cm)    Largest calf  47.3 cm  -AJ       Pathomechanics    Lower Extremity Pathomechanics  Antalgic with midstance  -AJ       Transfers    Comment (Transfers)  I with all transfers, leans to L with sit to/from stand  -AJ       Gait/Stairs Assessment/Training    Comment (Gait/Stairs)  FWB, mild antalgic gait with 1 axillary crutch.  -AJ      User Key  (r) = Recorded By, (t) = Taken By, (c) = Cosigned By    Initials Name Provider Type    Shakira Machado, PT DPT Physical Therapist              Therapy Education  Given: HEP, Symptoms/condition management, Posture/body mechanics, Mobility training, Edema management  Program: New  How Provided: Verbal, Demonstration, Written  Provided to: Patient  Level of Understanding: Verbalized, Demonstrated     PT OP Goals     Row Name 07/10/19 1400          PT Short Term Goals    STG Date  to Achieve  07/31/19  -     STG 1  I with HEP and have additions/changes by next recertification.  -     STG 2  AROm R knee 0°->= 115°  -     STG 3  R LE 4+/5 or greater for all.  -     STG 4  Decrease in R calf measurements by 2cm.  -     STG 5  AROM R DF >= 6°.  -        Long Term Goals    LTG Date to Achieve  08/23/19  -     LTG 1  AROM R erversion >= 30°.  -     LTG 2  B LE 5/5.  -     LTG 3  Patient able to perform 20 sit to/from stand no UE A, = WB B LEs.  -     LTG 4  Patient able to ambulate up/down 3 steps reciprocally no HRA x10.  -     LTG 5  Patient able ot mabulate with no assistive device non-antalgically 1/2 mile.  -     LTG 6  I with final HEP.  -        Time Calculation    PT Goal Re-Cert Due Date  07/31/19  -       User Key  (r) = Recorded By, (t) = Taken By, (c) = Cosigned By    Initials Name Provider Type    Shakira Machado, PT DPT Physical Therapist         Barriers to Rehab: Include significant or possible arthritic/degenerative changes that have occurred within the joint.    Safety Issues: None noted.      PT Assessment/Plan     Row Name 07/10/19 1400          PT Assessment    Functional Limitations  Impaired gait;Limitation in home management;Limitations in community activities;Performance in leisure activities;Performance in self-care ADL  -     Impairments  Balance;Edema;Endurance;Gait;Impaired flexibility;Impaired muscle endurance;Impaired muscle length;Impaired muscle power;Range of motion;Pain;Muscle strength;Locomotion;Joint mobility  -     Assessment Comments  Patient did well with HEP. Fatigues quickly with SLR.  -     Rehab Potential  Good  -     Patient/caregiver participated in establishment of treatment plan and goals  Yes  -     Patient would benefit from skilled therapy intervention  Yes  -        PT Plan    PT Frequency  2x/week  -     Predicted Duration of Therapy Intervention (Therapy Eval)  4-6 weks, 8-12 visits  -      Planned CPT's?  PT EVAL MOD COMPLELITY: 23839;PT RE-EVAL: 36692;PT THER PROC EA 15 MIN: 04066;PT THER ACT EA 15 MIN: 42715;PT GAIT TRAINING EA 15 MIN: 63069;PT THER SUPP EA 15 MIN  -AJ     Physical Therapy Interventions (Optional Details)  balance training;gait training;gross motor skills;home exercise program;manual therapy techniques;modalities;patient/family education;postural re-education;ROM (Range of Motion);strengthening;stretching;transfer training  -     PT Plan Comments  Progress overall strength, ROM, balance, gait, and endurance.  -AJ       User Key  (r) = Recorded By, (t) = Taken By, (c) = Cosigned By    Initials Name Provider Type    Shakira Machado, PT DPT Physical Therapist       Other therapeutic activities and/or exercises will be prescribed depending on the patients progress or lack there of.    Modalities     Row Name 07/10/19 1400             Ice    Ice Applied  Yes  -AJ      Location  R knee/calf- 2 bags  -AJ      Rx Minutes  15 mins  -AJ      Ice S/P Rx  Yes  -AJ        User Key  (r) = Recorded By, (t) = Taken By, (c) = Cosigned By    Initials Name Provider Type    Shakira Machado, PT DPT Physical Therapist        Exercises     Row Name 07/10/19 1400             Subjective Comments    Subjective Comments  Patient wishes to get it back like it was before he broke it.  -AJ         Subjective Pain    Able to rate subjective pain?  yes  -AJ      Pre-Treatment Pain Level  3  -AJ      Post-Treatment Pain Level  2  -AJ         Exercise 1    Exercise Name 1  Pro II LE  -AJ      Time 1  10 minutes  -AJ      Additional Comments  L 4.0  -AJ         Exercise 2    Exercise Name 2  Sit to/from stand  -AJ      Sets 2  2  -AJ      Reps 2  10  -AJ      Additional Comments  B UE A.  -AJ         Exercise 3    Exercise Name 3  R St. ITB S  -AJ      Reps 3  2  -AJ      Time 3  30 seconds  -AJ         Exercise 4    Exercise Name 4  R SLR  -AJ      Sets 4  2  -AJ      Reps 4  10  -AJ      Time 4   "5\" hold  -GUANAKO         Exercise 5    Exercise Name 5  R St. Gastroc S  -GUANAKO      Reps 5  2  -AJ      Time 5  30 seconds  -        User Key  (r) = Recorded By, (t) = Taken By, (c) = Cosigned By    Initials Name Provider Type    Shakira Machado, PT DPT Physical Therapist                        Outcome Measure Options: Lower Extremity Functional Scale (LEFS)  Lower Extremity Functional Index  Any of your usual work, housework or school activities: A little bit of difficulty  Your usual hobbies, recreational or sporting activities: Quite a bit of difficulty  Getting into or out of the bath: No difficulty  Walking between rooms: A little bit of difficulty  Putting on your shoes or socks: No difficulty  Squatting: Extreme difficulty or unable to perform activity  Lifting an object, like a bag of groceries from the floor: No difficulty  Performing light activities around your home: A little bit of difficulty  Performing heavy activities around your home: Quite a bit of difficulty  Getting into or out of a car: Quite a bit of difficulty  Walking 2 blocks: Quite a bit of difficulty  Walking a mile: Quite a bit of difficulty  Going up or down 10 stairs (about 1 flight of stairs): Moderate difficulty  Standing for 1 hour: No difficulty  Sitting for 1 hour: Moderate difficulty  Running on even ground: Extreme difficulty or unable to perform activity  Running on uneven ground: Extreme difficulty or unable to perform activity  Making sharp turns while running fast: Extreme difficulty or unable to perform activity  Hopping: Extreme difficulty or unable to perform activity  Rolling over in bed: No difficulty  Total: 38      Time Calculation:     Start Time: 1430  Stop Time: 1531  Time Calculation (min): 61 min  PT Non-Billable Time (min): 15 min  Total Timed Code Minutes- PT: 23 minute(s)     Therapy Charges for Today     Code Description Service Date Service Provider Modifiers Qty    23892313664 HC PT EVAL MOD COMPLEXITY 2 " 7/10/2019 Shakira Wyatt, PT DPT GP 1    26320939223 HC PT THER PROC EA 15 MIN 7/10/2019 Shakira Wyatt, PT DPT GP 2    71307189925 HC PT THER SUPP EA 15 MIN 7/10/2019 Shakira Wyatt, PT DPT GP 1          PT G-Codes  Outcome Measure Options: Lower Extremity Functional Scale (LEFS)  Total: 38         Shakira Wyatt PT DPT, [facility]  7/10/2019

## 2019-07-12 ENCOUNTER — HOSPITAL ENCOUNTER (OUTPATIENT)
Dept: PHYSICAL THERAPY | Facility: HOSPITAL | Age: 39
Setting detail: THERAPIES SERIES
Discharge: HOME | End: 2019-07-12

## 2019-07-12 DIAGNOSIS — S82.201S TIBIA/FIBULA FRACTURE, RIGHT, SEQUELA: Primary | ICD-10-CM

## 2019-07-12 DIAGNOSIS — S82.401S TIBIA/FIBULA FRACTURE, RIGHT, SEQUELA: Primary | ICD-10-CM

## 2019-07-12 PROCEDURE — 97110 THERAPEUTIC EXERCISES: CPT

## 2019-07-12 NOTE — THERAPY TREATMENT NOTE
Outpatient Physical Therapy Ortho Treatment Note  [facility]     Patient Name: Olivia Walden  : 1980  MRN: [id number]  Today's Date: 2019      Visit Date: 2019  Reports N/A% of improvement.  Attended 2/2 visits.  Insurance available: medical necessity  Next MD appt: 2019.  Recertification: 2019.      Visit Dx:    ICD-10-CM ICD-9-CM   1. Tibia/fibula fracture, right, sequela S82.201S 905.4    S82.401S        Patient Active Problem List   Diagnosis   • Essential hypertension   • Morbid obesity (CMS/formerly [facility])   • Exercise-induced asthma   • Excessive cerumen in left ear canal   • Major depressive disorder with single episode, in full remission (CMS/formerly [facility])   • Ankle sprain   • Atypical Plantar fasciitis   • Decreased hearing of both ears   • Edema   • Chronic pain of left knee   • Primary osteoarthritis of left knee   • Acute radial nerve palsy of left upper extremity        Past Medical History:   Diagnosis Date   • Depression     PHQ score 10, 16   • Hypertension    • Pneumonia         Past Surgical History:   Procedure Laterality Date   • FRACTURE SURGERY Right 2019    ORIF of R tibia   • TONSILLECTOMY  1988       PT Ortho     Row Name 19 1000       Precautions and Contraindications    Precautions/Limitations  no known precautions/limitations  -       Subjective Pain    Post-Treatment Pain Level  -- decreased  -       Posture/Observations    Observations  Edema  -    Posture/Observations Comments  Amb with 1 crutch. Edema observed at R calf area.   -    Row Name 07/10/19 1400       Subjective Comments    Subjective Comments  Patient wishes to get it back like it was before he broke it.  -       Precautions and Contraindications    Precautions/Limitations  no known precautions/limitations  -       Subjective Pain    Able to rate subjective pain?  yes  -    Pre-Treatment Pain Level  3  -    Post-Treatment Pain Level  2  -        Posture/Observations    Posture- WNL  Posture is WNL for B LE  -AJ    Observations  Edema  -AJ    Posture/Observations Comments  No distress, ambulates in with 1 axillary crutch on the L, wearing B slide sandals.  -AJ       Special Tests/Palpation    Special Tests/Palpation  -- Mild R lateral knee TTP.  -AJ       Right Lower Ext    Rt Knee Extension/Flexion AROM  0°-85°  -AJ    Rt Ankle Dorsiflexion AROM  -2°  -AJ    Rt Ankle Plantarflexion AROM  52°  -AJ    Rt Ankle Inversion AROM  32°  -AJ    Rt Ankle Eversion AROM  4°  -AJ       Left Lower Ext    Lt Knee Extension/Flexion AROM  0°-115°  -AJ    Lt Ankle Dorsiflexion AROM  8°  -AJ    Lt Ankle Plantarflexion AROM  52°  -AJ    Lt Ankle Inversion AROM  30°  -AJ    Lt Ankle Eversion AROM  30°  -AJ       MMT (Manual Muscle Testing)    General MMT Comments  B ankles 5/5. R QS 4+/5, R HS 4/5, R hip fexion 4+/5, L LE 5/5.  -AJ       Sensation    Sensation WNL?  WNL  -AJ    Light Touch  No apparent deficits  -AJ    Additional Comments  Denies any numbness or tingling.  -AJ       Lower Extremity Flexibility    Hamstrings  Bilateral:;Mildly limited;Moderately limited  -AJ    Gastrocnemius  Bilateral:;Mildly limited  -AJ    Soleus  Bilateral:;Mildly limited;Moderately limited  -AJ       RLE Quick Girth (cm)    Largest calf  52.1 cm  -AJ       LLE Quick Girth (cm)    Largest calf  47.3 cm  -AJ       Pathomechanics    Lower Extremity Pathomechanics  Antalgic with midstance  -AJ       Transfers    Comment (Transfers)  I with all transfers, leans to L with sit to/from stand  -AJ       Gait/Stairs Assessment/Training    Comment (Gait/Stairs)  FWB, mild antalgic gait with 1 axillary crutch.  -AJ      User Key  (r) = Recorded By, (t) = Taken By, (c) = Cosigned By    Initials Name Provider Type    Shakira Machado, PT DPT Physical Therapist    Moisés Byers PTA Physical Therapy Assistant                      PT Assessment/Plan     Row Name 07/12/19 1100          PT  "Assessment    Assessment Comments  Pt did well with today's visit. Intermittant complaints with exercises. Some cont edema at calf area that needs decreasing.    -JW        PT Plan    PT Frequency  2x/week  -JW     Predicted Duration of Therapy Intervention (Therapy Eval)  4-6 weeks  -JW     PT Plan Comments  Cont per POC  -JW       User Key  (r) = Recorded By, (t) = Taken By, (c) = Cosigned By    Initials Name Provider Type    Moisés Byers PTA Physical Therapy Assistant          Modalities     Row Name 07/12/19 1000             Ice    Ice Applied  Yes  -JW      Location  R knee/calf- 2 bags  -JW      Rx Minutes  10 mins  -JW      Ice S/P Rx  Yes  -JW        User Key  (r) = Recorded By, (t) = Taken By, (c) = Cosigned By    Initials Name Provider Type    Moisés Byers PTA Physical Therapy Assistant        Exercises     Row Name 07/12/19 1000             Subjective Comments    Subjective Comments  Pt reports mild LE pain today at shin area.   -JW         Subjective Pain    Able to rate subjective pain?  yes  -JW      Pre-Treatment Pain Level  3  -JW      Post-Treatment Pain Level  -- decreased  -JW         Exercise 1    Exercise Name 1  Manual calf S  -JW      Sets 1  2  -JW      Time 1  30\"  -JW         Exercise 2    Exercise Name 2  Manual PF S  -JW      Sets 2  2  -JW      Time 2  20\"  -JW         Exercise 3    Exercise Name 3  Prone knee flex S  -JW      Sets 3  2  -JW      Time 3  30\"  -JW         Exercise 4    Exercise Name 4  Prone HS curls  -JW      Reps 4  20  -JW         Exercise 5    Exercise Name 5  PRO2  -JW      Time 5  8'  -JW      Additional Comments  L3  -JW         Exercise 6    Exercise Name 6  Seated HS curls  -JW      Sets 6  2  -JW      Reps 6  10  -JW      Additional Comments  green tb  -JW         Exercise 7    Exercise Name 7  LAQ  -JW      Sets 7  2  -JW      Reps 7  10  -JW      Additional Comments  2# AW  -JW         Exercise 8    Exercise Name 8  SLR  -JW      Sets 8  2  -JW "      Reps 8  10  -        User Key  (r) = Recorded By, (t) = Taken By, (c) = Cosigned By    Initials Name Provider Type    Moisés Byers PTA Physical Therapy Assistant                       PT OP Goals     Row Name 07/12/19 1000          PT Short Term Goals    STG Date to Achieve  07/31/19  -     STG 1  I with HEP and have additions/changes by next recertification.  -     STG 2  AROm R knee 0°->= 115°  -     STG 3  R LE 4+/5 or greater for all.  -     STG 4  Decrease in R calf measurements by 2cm.  -     STG 5  AROM R DF >= 6°.  -        Long Term Goals    LTG Date to Achieve  08/23/19  -     LTG 1  AROM R erversion >= 30°.  -     LTG 2  B LE 5/5.  -     LTG 3  Patient able to perform 20 sit to/from stand no UE A, = WB B LEs.  -     LTG 4  Patient able to ambulate up/down 3 steps reciprocally no HRA x10.  -     LTG 5  Patient able ot mabulate with no assistive device non-antalgically 1/2 mile.  -     LTG 6  I with final HEP.  -       User Key  (r) = Recorded By, (t) = Taken By, (c) = Cosigned By    Initials Name Provider Type    Moisés Byers PTA Physical Therapy Assistant                         Time Calculation:   Start Time: 1017  Stop Time: 1110  Time Calculation (min): 53 min  PT Non-Billable Time (min): 10 min  Total Timed Code Minutes- PT: 43 minute(s)  Therapy Charges for Today     Code Description Service Date Service Provider Modifiers Qty    91094745069 HC PT THER SUPP EA 15 MIN 7/12/2019 Moisés Knight PTA GP 1    15599263786 HC PT THER PROC EA 15 MIN 7/12/2019 Moisés Knight PTA GP 3                    Moisés Knight PTA  7/12/2019

## 2019-07-15 ENCOUNTER — HOSPITAL ENCOUNTER (OUTPATIENT)
Dept: PHYSICAL THERAPY | Facility: HOSPITAL | Age: 39
Setting detail: THERAPIES SERIES
Discharge: HOME | End: 2019-07-15

## 2019-07-15 DIAGNOSIS — S82.201S TIBIA/FIBULA FRACTURE, RIGHT, SEQUELA: Primary | ICD-10-CM

## 2019-07-15 DIAGNOSIS — S82.401S TIBIA/FIBULA FRACTURE, RIGHT, SEQUELA: Primary | ICD-10-CM

## 2019-07-15 PROCEDURE — 97110 THERAPEUTIC EXERCISES: CPT | Performed by: SPECIALIST/TECHNOLOGIST

## 2019-07-17 ENCOUNTER — HOSPITAL ENCOUNTER (OUTPATIENT)
Dept: PHYSICAL THERAPY | Facility: HOSPITAL | Age: 39
Setting detail: THERAPIES SERIES
Discharge: HOME | End: 2019-07-17

## 2019-07-17 DIAGNOSIS — S82.201S TIBIA/FIBULA FRACTURE, RIGHT, SEQUELA: Primary | ICD-10-CM

## 2019-07-17 DIAGNOSIS — S82.401S TIBIA/FIBULA FRACTURE, RIGHT, SEQUELA: Primary | ICD-10-CM

## 2019-07-17 PROCEDURE — 97110 THERAPEUTIC EXERCISES: CPT

## 2019-07-17 NOTE — THERAPY TREATMENT NOTE
Outpatient Physical Therapy Ortho Treatment Note  [facility]  Bridget Mota PTA       Patient Name: Olivia Walden  : 1980  MRN: [id number]  Today's Date: 2019      Visit Date: 2019     Visits: 3/3  Insurance Visits Approved: medical necessity  Recert Due: 2019  MD Appt: 2019  Pain: pretreatment 3/10; post treatment 0/10  Improvement: pt is subjectively reporting 0% improvement since initial evaluation    Visit Dx:    ICD-10-CM ICD-9-CM   1. Tibia/fibula fracture, right, sequela S82.201S 905.4    S82.401S        Patient Active Problem List   Diagnosis   • Essential hypertension   • Morbid obesity (CMS/HCC)   • Exercise-induced asthma   • Excessive cerumen in left ear canal   • Major depressive disorder with single episode, in full remission (CMS/HCC)   • Ankle sprain   • Atypical Plantar fasciitis   • Decreased hearing of both ears   • Edema   • Chronic pain of left knee   • Primary osteoarthritis of left knee   • Acute radial nerve palsy of left upper extremity        Past Medical History:   Diagnosis Date   • Depression     PHQ score 10, 16   • Hypertension    • Pneumonia         Past Surgical History:   Procedure Laterality Date   • FRACTURE SURGERY Right 2019    ORIF of R tibia   • TONSILLECTOMY  1988       PT Ortho     Row Name 19 1400       Subjective Comments    Subjective Comments  reports that he is more sore today. has some pain.   -       Precautions and Contraindications    Precautions/Limitations  no known precautions/limitations  -       Subjective Pain    Able to rate subjective pain?  yes  -    Pre-Treatment Pain Level  3  -    Post-Treatment Pain Level  0  -       Posture/Observations    Posture/Observations Comments  ambulates with 1 axillary crutch, edema noted in right calf; wearing B athletic shoes  -    Row Name 07/15/19 1400       Precautions and Contraindications    Precautions/Limitations  no known  precautions/limitations  (Pended)   -ML       Posture/Observations    Posture/Observations Comments  Amb with 1 crutch. Edema observed at R calf area.   (Pended)   -      User Key  (r) = Recorded By, (t) = Taken By, (c) = Cosigned By    Initials Name Provider Type     Bridget Mota, MANUEL Physical Therapy Assistant    ML Brett Geremias Santosnis, ATC                       PT Assessment/Plan     Row Name 07/17/19 1400          PT Assessment    Assessment Comments  min lag noted with SLR, improves with cueing, but with fatigue has increased lag. good effort with all therex. able to complete sit to/from stand 20 reps with no use of hands  -        PT Plan    PT Frequency  2x/week  -     PT Plan Comments  advance as tolerates. resume step ups  -       User Key  (r) = Recorded By, (t) = Taken By, (c) = Cosigned By    Initials Name Provider Type     Bridget Mota PTA Physical Therapy Assistant          Modalities     Row Name 07/17/19 1400             Ice    Ice Applied  Yes  -      Location  right knee anterior  -      Rx Minutes  15 mins  -      Ice S/P Rx  Yes  -        User Key  (r) = Recorded By, (t) = Taken By, (c) = Cosigned By    Initials Name Provider Type     Bridget Mota PTA Physical Therapy Assistant        Exercises     Row Name 07/17/19 1400             Subjective Comments    Subjective Comments  reports that he is more sore today. has some pain.   -         Subjective Pain    Able to rate subjective pain?  yes  -      Pre-Treatment Pain Level  3  -      Post-Treatment Pain Level  0  -         Exercise 1    Exercise Name 1  Pro II LE's  -MH      Time 1  10 minutes  -      Additional Comments  L 5.0  -MH         Exercise 2    Exercise Name 2  B St. HS S  -MH      Reps 2  2  -MH      Time 2  30 sec hold  -MH         Exercise 3    Exercise Name 3  B St. Lunge S  -MH      Reps 3  10  -MH      Time 3  10 sec hold  -MH         Exercise 4    Exercise Name 4  B St.  Incline Calf S gastroc/soleus  -      Reps 4  2  -      Time 4  30 sec hold  -         Exercise 5    Exercise Name 5  Sit to/from stand  -      Reps 5  20  -         Exercise 6    Exercise Name 6  SLR Fwd Flexion  -      Reps 6  20  -      Time 6  5 sec hold  -         Exercise 7    Exercise Name 7  sidelying hip abd  -      Reps 7  20  -      Time 7  5 sec   -         Exercise 8    Exercise Name 8  prone hamcurls  -      Reps 8  20  -         Exercise 9    Exercise Name 9  Shuttle 2L/1L press  -      Time 9  5 minutes each  -      Additional Comments  7 cords  -        User Key  (r) = Recorded By, (t) = Taken By, (c) = Cosigned By    Initials Name Provider Type     Bridget Mota PTA Physical Therapy Assistant                       PT OP Goals     Row Name 07/17/19 1400          PT Short Term Goals    STG Date to Achieve  07/31/19  -     STG 1  I with HEP and have additions/changes by next recertification.  -     STG 1 Progress  Met;Ongoing  -     STG 2  AROm R knee 0°->= 115°  -     STG 3  R LE 4+/5 or greater for all.  -     STG 4  Decrease in R calf measurements by 2cm.  -     STG 5  AROM R DF >= 6°.  -        Long Term Goals    LTG Date to Achieve  08/23/19  -     LTG 1  AROM R erversion >= 30°.  -     LTG 2  B LE 5/5.  -     LTG 3  Patient able to perform 20 sit to/from stand no UE A, = WB B LEs.  -     LTG 3 Progress  Met  -     LTG 4  Patient able to ambulate up/down 3 steps reciprocally no HRA x10.  -     LTG 5  Patient able ot mabulate with no assistive device non-antalgically 1/2 mile.  -     LTG 6  I with final HEP.  -        Time Calculation    PT Goal Re-Cert Due Date  07/31/19  -       User Key  (r) = Recorded By, (t) = Taken By, (c) = Cosigned By    Initials Name Provider Type    Bridget Lew PTA Physical Therapy Assistant          Therapy Education  Given: HEP, Symptoms/condition management, Posture/body mechanics, Mobility  training, Edema management  Program: Reinforced  How Provided: Verbal, Demonstration  Provided to: Patient  Level of Understanding: Verbalized              Time Calculation:   Start Time: 1430  Stop Time: 1535  Time Calculation (min): 65 min  PT Non-Billable Time (min): 15 min  Total Timed Code Minutes- PT: 50 minute(s)  Therapy Charges for Today     Code Description Service Date Service Provider Modifiers Qty    39385353056 HC PT THER PROC EA 15 MIN 7/17/2019 Bridget Mota, MANUEL GP 3    60800573922 HC PT THER SUPP EA 15 MIN 7/17/2019 Bridget Mota PTA GP 1                    Bridget Mota PTA  7/17/2019

## 2019-07-22 ENCOUNTER — HOSPITAL ENCOUNTER (OUTPATIENT)
Dept: PHYSICAL THERAPY | Facility: HOSPITAL | Age: 39
Setting detail: THERAPIES SERIES
Discharge: HOME | End: 2019-07-22

## 2019-07-22 DIAGNOSIS — S82.401S TIBIA/FIBULA FRACTURE, RIGHT, SEQUELA: Primary | ICD-10-CM

## 2019-07-22 DIAGNOSIS — S82.201S TIBIA/FIBULA FRACTURE, RIGHT, SEQUELA: Primary | ICD-10-CM

## 2019-07-22 PROCEDURE — 97110 THERAPEUTIC EXERCISES: CPT

## 2019-07-22 NOTE — THERAPY TREATMENT NOTE
Outpatient Physical Therapy Ortho Treatment Note  [facility]     Patient Name: Olivia Walden  : 1980  MRN: [id number]  Today's Date: 2019      Visit Date: 2019  Pt reports 3/10 pain pre treatment, 0/10 pain post treatment  Reports 50% of improvement.  Attended 4/4 visits.  Insurance available:medicare necessity   Next MD appt:2019.  Recertification: 2019.  Visit Dx:    ICD-10-CM ICD-9-CM   1. Tibia/fibula fracture, right, sequela S82.201S 905.4    S82.401S        Patient Active Problem List   Diagnosis   • Essential hypertension   • Morbid obesity (CMS/HCC)   • Exercise-induced asthma   • Excessive cerumen in left ear canal   • Major depressive disorder with single episode, in full remission (CMS/HCC)   • Ankle sprain   • Atypical Plantar fasciitis   • Decreased hearing of both ears   • Edema   • Chronic pain of left knee   • Primary osteoarthritis of left knee   • Acute radial nerve palsy of left upper extremity        Past Medical History:   Diagnosis Date   • Depression     PHQ score 10, 16   • Hypertension    • Pneumonia         Past Surgical History:   Procedure Laterality Date   • FRACTURE SURGERY Right 2019    ORIF of R tibia   • TONSILLECTOMY  1988       PT Ortho     Row Name 19 1400       Precautions and Contraindications    Precautions/Limitations  no known precautions/limitations  -TL       Subjective Pain    Able to rate subjective pain?  yes  -TL    Pre-Treatment Pain Level  3  -TL       Posture/Observations    Posture/Observations Comments  pt came in today without assisted device limping. Pt is wearing tennis today.  -TL      User Key  (r) = Recorded By, (t) = Taken By, (c) = Cosigned By    Initials Name Provider Type    TL Harriet Mai PTA Physical Therapy Assistant                      PT Assessment/Plan     Row Name 19 1500          PT Assessment    Assessment Comments  pt has met 1,2 and 5 short term goals  and #3LTGs. pt still has alot of swelling in the right leg. pt has not been icing or wearing his jitendra hose or compression hose per patient. Pt stated that he forgets. pt tolerated the new ex of step downs with ecc quad control. pt ROM with right knee and DF of the right ankle. Pt progressing toward goals. Pt walked in with some muscle guarding to the right leg. PTA focused on proper gait sequence with push off and decreasing stride length to the right leg.  -TL        PT Plan    PT Frequency  2x/week  -TL     Predicted Duration of Therapy Intervention (Therapy Eval)  4-6 weeks  -TL     PT Plan Comments  Check strength next visit  -TL       User Key  (r) = Recorded By, (t) = Taken By, (c) = Cosigned By    Initials Name Provider Type    Harriet Chin PTA Physical Therapy Assistant          Modalities     Row Name 07/22/19 1500             Ice    Ice Applied  Yes  -TL      Location  right knee/shin  -TL      Rx Minutes  12 mins  -TL      Ice S/P Rx  Yes  -TL        User Key  (r) = Recorded By, (t) = Taken By, (c) = Cosigned By    Initials Name Provider Type    Harriet Chin PTA Physical Therapy Assistant        Exercises     Row Name 07/22/19 1400             Subjective Comments    Subjective Comments  pt reports that he is trying not to use crutch. pt came in 11mins late for appt.  -TL         Subjective Pain    Able to rate subjective pain?  yes  -TL      Pre-Treatment Pain Level  3  -TL         Exercise 1    Exercise Name 1  Pro II LE's  -TL      Time 1  10 minutes  -TL      Additional Comments  level 5.5  -TL         Exercise 2    Exercise Name 2  B St. HS S  -TL      Reps 2  2  -TL      Time 2  30 sec hold  -TL         Exercise 3    Exercise Name 3  incline S Bilateral  -TL      Reps 3  2  -TL      Time 3  30 sec hold  -TL         Exercise 4    Exercise Name 4  lunge S  -TL      Reps 4  10  -TL      Time 4  10 sec hold  -TL         Exercise 5    Exercise Name 5  step up forward  -TL      Sets 5  2   -TL      Reps 5  10  -TL         Exercise 6    Exercise Name 6  step up lateral  -TL      Sets 6  2  -TL      Reps 6  10  -TL         Exercise 7    Exercise Name 7  step downs ecc  -TL      Sets 7  2  -TL      Reps 7  20  -TL         Exercise 8    Exercise Name 8  shuttle 2L/1L  -TL      Time 8  5 mins each  -TL      Additional Comments  7 cords  -TL        User Key  (r) = Recorded By, (t) = Taken By, (c) = Cosigned By    Initials Name Provider Type    TL Harriet Mai PTA Physical Therapy Assistant                       PT OP Goals     Row Name 07/22/19 1400          PT Short Term Goals    STG Date to Achieve  07/31/19  -TL     STG 1  I with HEP and have additions/changes by next recertification.  -TL     STG 1 Progress  Met;Ongoing  -TL     STG 2  AROm R knee 0°->= 115°  -TL     STG 2 Progress  Met  -TL     STG 2 Progress Comments  0-125  -TL     STG 3  R LE 4+/5 or greater for all.  -TL     STG 3 Progress  Ongoing;Progressing  -TL     STG 4  Decrease in R calf measurements by 2cm.  -TL     STG 4 Progress  Ongoing  -TL     STG 5  AROM R DF >= 6°.  -TL     STG 5 Progress  Met  -TL     STG 5 Progress Comments  7degrees  -TL        Long Term Goals    LTG Date to Achieve  08/23/19  -TL     LTG 1  AROM R erversion >= 30°.  -TL     LTG 2  B LE 5/5.  -TL     LTG 3  Patient able to perform 20 sit to/from stand no UE A, = WB B LEs.  -TL     LTG 3 Progress  Met  -TL     LTG 4  Patient able to ambulate up/down 3 steps reciprocally no HRA x10.  -TL     LTG 5  Patient able ot mabulate with no assistive device non-antalgically 1/2 mile.  -TL     LTG 5 Progress  Ongoing  -TL     LTG 6  I with final HEP.  -TL        Time Calculation    PT Goal Re-Cert Due Date  07/31/19  -TL       User Key  (r) = Recorded By, (t) = Taken By, (c) = Cosigned By    Initials Name Provider Type    Harriet Chin PTA Physical Therapy Assistant          Therapy Education  Education Details: ice   Given: HEP, Symptoms/condition management, Pain  management, Posture/body mechanics  Program: New, Reinforced  How Provided: Verbal, Demonstration  Provided to: Patient  Level of Understanding: Verbalized, Demonstrated              Time Calculation:   Start Time: 1441  Stop Time: 1553  Time Calculation (min): 72 min  PT Non-Billable Time (min): 15 min  Total Timed Code Minutes- PT: 57 minute(s)  Therapy Charges for Today     Code Description Service Date Service Provider Modifiers Qty    78093022402 HC PT THER PROC EA 15 MIN 7/22/2019 Harriet Mai PTA GP 4    16532882690 HC PT THER SUPP EA 15 MIN 7/22/2019 Harriet Mai, MANUEL GP 1                    Harriet Mai PTA  7/22/2019

## 2019-07-24 ENCOUNTER — HOSPITAL ENCOUNTER (OUTPATIENT)
Dept: PHYSICAL THERAPY | Facility: HOSPITAL | Age: 39
Setting detail: THERAPIES SERIES
Discharge: HOME | End: 2019-07-24

## 2019-07-24 DIAGNOSIS — S82.201S TIBIA/FIBULA FRACTURE, RIGHT, SEQUELA: Primary | ICD-10-CM

## 2019-07-24 DIAGNOSIS — S82.401S TIBIA/FIBULA FRACTURE, RIGHT, SEQUELA: Primary | ICD-10-CM

## 2019-07-24 PROCEDURE — 97110 THERAPEUTIC EXERCISES: CPT

## 2019-07-24 NOTE — THERAPY TREATMENT NOTE
Outpatient Physical Therapy Ortho Treatment Note  [facility]  Bridget Mota PTA       Patient Name: Olivia Walden  : 1980  MRN: [id number]  Today's Date: 2019      Visit Date: 2019     Visits:  Insurance Visits Approved: medical necessity  Recert Due: 2019  MD Appt: 2019  Pain: pretreatment 0/10; post treatment 3/10  Improvement: pt is subjectively reporting 50% improvement since initial evaluation    Visit Dx:    ICD-10-CM ICD-9-CM   1. Tibia/fibula fracture, right, sequela S82.201S 905.4    S82.401S        Patient Active Problem List   Diagnosis   • Essential hypertension   • Morbid obesity (CMS/HCC)   • Exercise-induced asthma   • Excessive cerumen in left ear canal   • Major depressive disorder with single episode, in full remission (CMS/HCC)   • Ankle sprain   • Atypical Plantar fasciitis   • Decreased hearing of both ears   • Edema   • Chronic pain of left knee   • Primary osteoarthritis of left knee   • Acute radial nerve palsy of left upper extremity        Past Medical History:   Diagnosis Date   • Depression     PHQ score 10, 16   • Hypertension    • Pneumonia         Past Surgical History:   Procedure Laterality Date   • FRACTURE SURGERY Right 2019    ORIF of R tibia   • TONSILLECTOMY         PT Ortho     Row Name 19 1400       Subjective Comments    Subjective Comments  pt reports immediately that he has a problem today and that he forgot his tennis shoes. only has his slide on sandles with him. apologizes. reports that he knows he needs to wear athletic shoes.   -       Precautions and Contraindications    Precautions/Limitations  no known precautions/limitations  -       Subjective Pain    Able to rate subjective pain?  yes  -    Pre-Treatment Pain Level  0  -    Post-Treatment Pain Level  3  -       Posture/Observations    Posture/Observations Comments  ambulates with no assistive device, wearing slide on  sandles but immediately apologizes and states that he knows he is supposed to wear athletic shoes  -       Right Lower Ext    Rt Ankle Dorsiflexion AROM  12°  -    Rt Ankle Inversion AROM  30°  -    Rt Ankle Eversion AROM  16°  -       RLE Quick Girth (cm)    Largest calf  51.5 cm  -       LLE Quick Girth (cm)    Largest calf  47.6 cm  -    Row Name 07/22/19 1400       Precautions and Contraindications    Precautions/Limitations  no known precautions/limitations  -       Subjective Pain    Able to rate subjective pain?  yes  -    Pre-Treatment Pain Level  3  -       Posture/Observations    Posture/Observations Comments  pt came in today without assisted device limping. Pt is wearing tennis today.  -      User Key  (r) = Recorded By, (t) = Taken By, (c) = Cosigned By    Initials Name Provider Type     Bridget Mota, MANUEL Physical Therapy Assistant    TL Harriet Mai, MANUEL Physical Therapy Assistant                      PT Assessment/Plan     Row Name 07/24/19 1400          PT Assessment    Assessment Comments  patient has improved AROM of right ankle but still lacking ankle eversion. has a smaller girth measurement around largest portion of right but still showing edema.   -        PT Plan    PT Frequency  2x/week  -     PT Plan Comments  MD note next visit  -       User Key  (r) = Recorded By, (t) = Taken By, (c) = Cosigned By    Initials Name Provider Type     Bridget Mota, MANUEL Physical Therapy Assistant            Exercises     Row Name 07/24/19 1400             Subjective Comments    Subjective Comments  pt reports immediately that he has a problem today and that he forgot his tennis shoes. only has his slide on sandles with him. apologizes. reports that he knows he needs to wear athletic shoes.   -         Subjective Pain    Able to rate subjective pain?  yes  -      Pre-Treatment Pain Level  0  -      Post-Treatment Pain Level  3  -         Exercise 1    Exercise Name  1  Pro II LE's  -      Time 1  10 minutes  -      Additional Comments  L 8.0  -         Exercise 2    Exercise Name 2  Incline Gastroc/Soleus S  -      Reps 2  2  -      Time 2  30 sec hold each  -         Exercise 3    Exercise Name 3  Reciprocal Stairs 1 HR   -      Reps 3  10  -MH      Additional Comments  completes with no HR but has hesitency and feeling of leg going to give out  -         Exercise 4    Exercise Name 4  B St. Lunge S  -      Reps 4  10  -      Time 4  10 sec hold  -         Exercise 5    Exercise Name 5  Step Up Fwd  -      Reps 5  20  -      Additional Comments  6 inch  -         Exercise 6    Exercise Name 6  Step Up Lat  -MH      Reps 6  20  -      Additional Comments  6 inch  -         Exercise 7    Exercise Name 7  Ecc Step Downs  -      Reps 7  20  -      Additional Comments  6 inch  -         Exercise 8    Exercise Name 8  AROM eversion with clinician applied passive stretch  -      Reps 8  20  -MH        User Key  (r) = Recorded By, (t) = Taken By, (c) = Cosigned By    Initials Name Provider Type    Bridget Lew, PTA Physical Therapy Assistant                       PT OP Goals     Row Name 07/24/19 1400          PT Short Term Goals    STG Date to Achieve  07/31/19  -     STG 1  I with HEP and have additions/changes by next recertification.  -     STG 1 Progress  Met;Ongoing  -     STG 2  AROm R knee 0°->= 115°  -     STG 2 Progress  Met  [facility]     STG 3  R LE 4+/5 or greater for all.  -     STG 3 Progress  Ongoing;Progressing  -     STG 4  Decrease in R calf measurements by 2cm.  -     STG 4 Progress  Ongoing  -     STG 5  AROM R DF >= 6°.  -     STG 5 Progress  Met  -        Long Term Goals    LTG Date to Achieve  08/23/19  -     LTG 1  AROM R erversion >= 30°.  -     LTG 2  B LE 5/5.  -     LTG 3  Patient able to perform 20 sit to/from stand no UE A, = WB B LEs.  -     LTG 3 Progress  Met  -     LTG 4  Patient  able to ambulate up/down 3 steps reciprocally no HRA x10.  -     LTG 4 Progress  Partially Met  -     LTG 5  Patient able ot mabulate with no assistive device non-antalgically 1/2 mile.  -     LTG 5 Progress  Partially Met  -     LTG 6  I with final HEP.  -        Time Calculation    PT Goal Re-Cert Due Date  07/31/19  -       User Key  (r) = Recorded By, (t) = Taken By, (c) = Cosigned By    Initials Name Provider Type     Bridget Mota PTA Physical Therapy Assistant                         Time Calculation:   Start Time: 1430  Stop Time: 1530  Time Calculation (min): 60 min  PT Non-Billable Time (min): 15 min  Total Timed Code Minutes- PT: 45 minute(s)  Therapy Charges for Today     Code Description Service Date Service Provider Modifiers Qty    40150087970 HC PT THER PROC EA 15 MIN 7/24/2019 Bridget Mota PTA GP 3    59595238213 HC PT THER SUPP EA 15 MIN 7/24/2019 Bridget Mota PTA GP 1                    Bridget Mota PTA  7/24/2019

## 2019-07-29 ENCOUNTER — HOSPITAL ENCOUNTER (OUTPATIENT)
Dept: PHYSICAL THERAPY | Facility: HOSPITAL | Age: 39
Setting detail: THERAPIES SERIES
Discharge: HOME | End: 2019-07-29

## 2019-07-29 DIAGNOSIS — S82.401S TIBIA/FIBULA FRACTURE, RIGHT, SEQUELA: Primary | ICD-10-CM

## 2019-07-29 DIAGNOSIS — S82.201S TIBIA/FIBULA FRACTURE, RIGHT, SEQUELA: Primary | ICD-10-CM

## 2019-07-29 PROCEDURE — 97110 THERAPEUTIC EXERCISES: CPT

## 2019-07-29 NOTE — THERAPY TREATMENT NOTE
Outpatient Physical Therapy Ortho Treatment Note  [facility]  Bridget Mota PTA       Patient Name: Olivia Walden  : 1980  MRN: [id number]  Today's Date: 2019      Visit Date: 2019     Visits:  Insurance Visits Approved: medical necessity  Recert Due: 2019  MD Appt: 2019  Pain: pretreatment 4/10; post treatment 4/10  Improvement: pt is subjectively reporting 50% improvement since initial evaluation        Visit Dx:    ICD-10-CM ICD-9-CM   1. Tibia/fibula fracture, right, sequela S82.201S 905.4    S82.401S        Patient Active Problem List   Diagnosis   • Essential hypertension   • Morbid obesity (CMS/HCC)   • Exercise-induced asthma   • Excessive cerumen in left ear canal   • Major depressive disorder with single episode, in full remission (CMS/HCC)   • Ankle sprain   • Atypical Plantar fasciitis   • Decreased hearing of both ears   • Edema   • Chronic pain of left knee   • Primary osteoarthritis of left knee   • Acute radial nerve palsy of left upper extremity        Past Medical History:   Diagnosis Date   • Depression     PHQ score 10, 16   • Hypertension    • Pneumonia         Past Surgical History:   Procedure Laterality Date   • FRACTURE SURGERY Right 2019    ORIF of R tibia   • TONSILLECTOMY         PT Ortho     Row Name 19 1700       Subjective Comments    Subjective Comments  states that he saw the doctor last friday. reports that doctor said everything looks ok. goes back to work on monday next week. reduced hours are only restrictions at this time. will see the doctor again on  for recheck.   -       Precautions and Contraindications    Precautions/Limitations  no known precautions/limitations  -       Subjective Pain    Able to rate subjective pain?  yes  -    Pre-Treatment Pain Level  4  -MH    Post-Treatment Pain Level  4  -       Posture/Observations    Posture/Observations Comments  pt  arrives 20 minutes late, ambulating with a limp and antalgic gait  -      User Key  (r) = Recorded By, (t) = Taken By, (c) = Cosigned By    Initials Name Provider Type    Bridget Lew PTA Physical Therapy Assistant                      PT Assessment/Plan     Row Name 07/29/19 1700          PT Assessment    Assessment Comments  demonstrating antalgic gait today.   -        PT Plan    PT Frequency  2x/week  -     PT Plan Comments  recheck next visit  -       User Key  (r) = Recorded By, (t) = Taken By, (c) = Cosigned By    Initials Name Provider Type    Bridget Lew PTA Physical Therapy Assistant          Modalities     Row Name 07/29/19 1700             Ice    Ice Applied  Yes to go  -        User Key  (r) = Recorded By, (t) = Taken By, (c) = Cosigned By    Initials Name Provider Type    Bridget Lew PTA Physical Therapy Assistant        Exercises     Row Name 07/29/19 1700             Subjective Comments    Subjective Comments  states that he saw the doctor last friday. reports that doctor said everything looks ok. goes back to work on monday next week. reduced hours are only restrictions at this time. will see the doctor again on 27th of august for recheck.   -         Subjective Pain    Able to rate subjective pain?  yes  -      Pre-Treatment Pain Level  4  -      Post-Treatment Pain Level  4  -         Exercise 1    Exercise Name 1  Pro II LE's  -      Time 1  10 minutes  -         Exercise 2    Exercise Name 2  Incline Gastroc/Soleus S  -      Reps 2  2  -      Time 2  30 sec hold  -         Exercise 3    Exercise Name 3  Reciprocal stairs  -      Reps 3  10  -      Additional Comments  no HR, no hesitancy  -         Exercise 4    Exercise Name 4  sit to/from stand  -      Reps 4  20  -MH      Additional Comments  equal WB  -         Exercise 5    Exercise Name 5  Track Walk   -      Reps 5  3 laps  -         Exercise 6    Exercise Name 6  SLR fwd flex   -      Reps 6  20  -      Time 6  5 sec hold  -      Additional Comments  fatigues  -         Exercise 7    Exercise Name 7  Ankle 4 way tband  -      Reps 7  20 each  -      Additional Comments  green  -        User Key  (r) = Recorded By, (t) = Taken By, (c) = Cosigned By    Initials Name Provider Type     Bridget Mota PTA Physical Therapy Assistant                       PT OP Goals     Row Name 07/29/19 1700          PT Short Term Goals    STG Date to Achieve  07/31/19  -     STG 1  I with HEP and have additions/changes by next recertification.  -     STG 1 Progress  Met;Ongoing  -     STG 2  AROm R knee 0°->= 115°  -     STG 2 Progress  Met  -     STG 3  R LE 4+/5 or greater for all.  -     STG 3 Progress  Ongoing;Progressing  -     STG 4  Decrease in R calf measurements by 2cm.  -     STG 4 Progress  Ongoing  -     STG 5  AROM R DF >= 6°.  -     STG 5 Progress  Met  -        Long Term Goals    LTG Date to Achieve  08/23/19  -     LTG 1  AROM R erversion >= 30°.  -     LTG 2  B LE 5/5.  -     LTG 3  Patient able to perform 20 sit to/from stand no UE A, = WB B LEs.  -     LTG 3 Progress  Met  -     LTG 4  Patient able to ambulate up/down 3 steps reciprocally no HRA x10.  -     LTG 4 Progress  Met  -     LTG 5  Patient able ot mabulate with no assistive device non-antalgically 1/2 mile.  -     LTG 5 Progress  Partially Met  -     LTG 6  I with final HEP.  -        Time Calculation    PT Goal Re-Cert Due Date  07/31/19  -       User Key  (r) = Recorded By, (t) = Taken By, (c) = Cosigned By    Initials Name Provider Type     Bridget Mota PTA Physical Therapy Assistant          Therapy Education  Education Details: ankle 4 way, walking 5 mins 2x per day  Given: HEP, Symptoms/condition management, Pain management, Posture/body mechanics  Program: Reinforced, New  How Provided: Verbal, Demonstration, Written  Provided to: Patient  Level of  Understanding: Teach back education performed, Verbalized, Demonstrated              Time Calculation:   Start Time: 1705  Stop Time: 1803  Time Calculation (min): 58 min  Total Timed Code Minutes- PT: 58 minute(s)  Therapy Charges for Today     Code Description Service Date Service Provider Modifiers Qty    77511828620 HC PT THER PROC EA 15 MIN 7/29/2019 Bridget Mota PTA GP 4    07880459326 HC PT THER SUPP EA 15 MIN 7/29/2019 Bridget Mota PTA GP 1                    Bridget Mota PTA  7/29/2019

## 2019-07-31 ENCOUNTER — HOSPITAL ENCOUNTER (OUTPATIENT)
Dept: PHYSICAL THERAPY | Facility: HOSPITAL | Age: 39
Setting detail: THERAPIES SERIES
Discharge: HOME | End: 2019-07-31

## 2019-07-31 DIAGNOSIS — S82.401S TIBIA/FIBULA FRACTURE, RIGHT, SEQUELA: Primary | ICD-10-CM

## 2019-07-31 DIAGNOSIS — S82.201S TIBIA/FIBULA FRACTURE, RIGHT, SEQUELA: Primary | ICD-10-CM

## 2019-07-31 PROCEDURE — 97110 THERAPEUTIC EXERCISES: CPT | Performed by: PHYSICAL THERAPIST

## 2019-08-06 ENCOUNTER — HOSPITAL ENCOUNTER (OUTPATIENT)
Dept: PHYSICAL THERAPY | Facility: HOSPITAL | Age: 39
Setting detail: THERAPIES SERIES
Discharge: HOME | End: 2019-08-06

## 2019-08-06 DIAGNOSIS — S82.201S TIBIA/FIBULA FRACTURE, RIGHT, SEQUELA: Primary | ICD-10-CM

## 2019-08-06 DIAGNOSIS — S82.401S TIBIA/FIBULA FRACTURE, RIGHT, SEQUELA: Primary | ICD-10-CM

## 2019-08-06 PROCEDURE — 97110 THERAPEUTIC EXERCISES: CPT

## 2019-08-06 NOTE — THERAPY TREATMENT NOTE
"    Outpatient Physical Therapy Ortho Treatment Note  [facility]  Bridget Mota, MANUEL       Patient Name: Olivia Walden  : 1980  MRN: [id number]  Today's Date: 2019      Visit Date: 2019     Visits:   Insurance Visits Approved: medical necessity  Recert Due: 2019  MD Appt: TBD  Pain: pretreatment 5.5-6/10/10; post treatment \"sore\"/10  Improvement: pt is subjectively reporting 75% improvement since initial evaluation    Visit Dx:    ICD-10-CM ICD-9-CM   1. Tibia/fibula fracture, right, sequela S82.201S 905.4    S82.401S        Patient Active Problem List   Diagnosis   • Essential hypertension   • Morbid obesity (CMS/HCC)   • Exercise-induced asthma   • Excessive cerumen in left ear canal   • Major depressive disorder with single episode, in full remission (CMS/[facility])   • Ankle sprain   • Atypical Plantar fasciitis   • Decreased hearing of both ears   • Edema   • Chronic pain of left knee   • Primary osteoarthritis of left knee   • Acute radial nerve palsy of left upper extremity        Past Medical History:   Diagnosis Date   • Depression     PHQ score 10, 16   • Hypertension    • Pneumonia         Past Surgical History:   Procedure Laterality Date   • FRACTURE SURGERY Right 2019    ORIF of R tibia   • TONSILLECTOMY         PT Ortho     Row Name 19 1400       Subjective Comments    Subjective Comments  pt reports that he worked 6 hours today. just got off of work. hurting today. reports that he hasn't gotten his compression socks yet.   -       Precautions and Contraindications    Precautions/Limitations  no known precautions/limitations  -       Subjective Pain    Able to rate subjective pain?  yes  -    Pre-Treatment Pain Level  -- 5.5-6/10  -    Post-Treatment Pain Level  -- sore  -       Posture/Observations    Posture/Observations Comments  arrives 10 minutes late with mild limp with gait  -      User Key  (r) = Recorded By, (t) " = Taken By, (c) = Cosigned By    Initials Name Provider Type     Bridget Mota, MANUEL Physical Therapy Assistant                      PT Assessment/Plan     Row Name 08/06/19 1400          PT Assessment    Assessment Comments  initiated aquatics per primary PT today with POC to be land and pool each visit. patient did well with initiation of higher level functional activities in the pool today.   -        PT Plan    PT Frequency  2x/week Land/Pool  -     PT Plan Comments  next visit fwd/retro jog with kickboard resist  -       User Key  (r) = Recorded By, (t) = Taken By, (c) = Cosigned By    Initials Name Provider Type     Bridget Mota PTA Physical Therapy Assistant            Exercises     Row Name 08/06/19 1400             Subjective Comments    Subjective Comments  pt reports that he worked 6 hours today. just got off of work. hurting today. reports that he hasn't gotten his compression socks yet.   -         Subjective Pain    Able to rate subjective pain?  yes  -      Pre-Treatment Pain Level  -- 5.5-6/10  -      Post-Treatment Pain Level  -- sore  -         Exercise 1    Exercise Name 1  Pro II LE's  -      Time 1  10 minutes  -      Additional Comments  L 9.0  -         Exercise 2    Exercise Name 2  incline Gastroc/Soleus S  -      Reps 2  2  -      Time 2  30 sec hold  -         Exercise 3    Exercise Name 3  airx beam fwd with ms  -MH      Time 3  4 minutes  -         Exercise 4    Exercise Name 4  airx beam lat with MS  -MH      Time 4  4 minutes  -         Exercise 5    Exercise Name 5  reciprocal stairs  -      Reps 5  10  -         Exercise 6    Exercise Name 6  aqua: Jog alt fwd/retro  -MH      Time 6  10 minutes  -         Exercise 7    Exercise Name 7  aqua: Lat squat shuffle  -      Time 7  5 minutes  -         Exercise 8    Exercise Name 8  aqua: Jumping Jacks  -      Reps 8  2  -      Time 8  1 minutes  -         Exercise 9    Exercise Name 9   aqua: Mini Squats  -      Reps 9  20  -MH         Exercise 10    Exercise Name 10  aqua: High Knees  -      Reps 10  2  -MH      Time 10  1 minutes  -         Exercise 11    Exercise Name 11  aqua: HR/TR  -      Reps 11  20  -MH         Exercise 12    Exercise Name 12  aqua: Lunge stance push/pull B Lead  -      Reps 12  20 each  -MH         Exercise 13    Exercise Name 13  aqua: [place]  -MH      Time 13  5 mins  -MH         Exercise 14    Exercise Name 14  aqua: Skipping  -MH      Time 14  5 mins  -MH         Exercise 15    Exercise Name 15  aqua: Fwd Sprint 4 foot to steps and then steps to 4 foot  -      Time 15  5 mins  -        User Key  (r) = Recorded By, (t) = Taken By, (c) = Cosigned By    Initials Name Provider Type    Bridget Lew, PTA Physical Therapy Assistant                       PT OP Goals     Row Name 08/06/19 1400          PT Short Term Goals    STG Date to Achieve  07/31/19  -     STG 1  I with HEP and have additions/changes by next recertification.  -     STG 1 Progress  Met  -     STG 2  AROm R knee 0°->= 115°  -     STG 2 Progress  Met  [facility]     STG 3  R LE 4+/5 or greater for all.  -     STG 3 Progress  Met  -     STG 4  Decrease in R calf measurements by 2cm.  -     STG 4 Progress  Ongoing;Not Met  -     STG 5  AROM R DF >= 6°.  -     STG 5 Progress  Met  -        Long Term Goals    LTG Date to Achieve  08/23/19  -     LTG 1  AROM R erversion >= 30°.  -     LTG 1 Progress  Met  -     LTG 2  B LE 5/5.  -     LTG 2 Progress  Met  -     LTG 3  Patient able to perform 20 sit to/from stand no UE A, = WB B LEs.  -     LTG 3 Progress  Met  -     LTG 4  Patient able to ambulate up/down 3 steps reciprocally no HRA x10.  -     LTG 4 Progress  Met  [facility]     LTG 5  Patient able ot ambulate with no assistive device non-antalgically 1/2 mile.  -     LTG 5 Progress  Partially Met  [facility]     LTG 6  I with final HEP.  -     LTG 6 Progress  Ongoing  -      LTG 7  Patient able to perform jumping activites with no increas ein pain  -     LTG 7 Progress  New  -     LTG 8  Patient able to perfrom light jogging with no increase in pain.  -[facility] 8 Progress  New  -        Time Calculation    PT Goal Re-Cert Due Date  -- N/A  -       User Key  (r) = Recorded By, (t) = Taken By, (c) = Cosigned By    Initials Name Provider Type     Bridget Mota PTA Physical Therapy Assistant                         Time Calculation:   Start Time: 1440  Stop Time: 1605  Time Calculation (min): 85 min  Total Timed Code Minutes- PT: 85 minute(s)  Therapy Charges for Today     Code Description Service Date Service Provider Modifiers Qty    95998692605 HC PT THER PROC EA 15 MIN 8/6/2019 Bridget Mota PTA GP 6    42499316427 HC PT THER SUPP EA 15 MIN 8/6/2019 Bridget Mota PTA GP 1                    Bridget Mota PTA  8/6/2019

## 2019-08-08 ENCOUNTER — HOSPITAL ENCOUNTER (OUTPATIENT)
Dept: PHYSICAL THERAPY | Facility: HOSPITAL | Age: 39
Setting detail: THERAPIES SERIES
Discharge: HOME | End: 2019-08-08

## 2019-08-08 DIAGNOSIS — S82.401S TIBIA/FIBULA FRACTURE, RIGHT, SEQUELA: Primary | ICD-10-CM

## 2019-08-08 DIAGNOSIS — S82.201S TIBIA/FIBULA FRACTURE, RIGHT, SEQUELA: Primary | ICD-10-CM

## 2019-08-08 PROCEDURE — 97110 THERAPEUTIC EXERCISES: CPT

## 2019-08-08 PROCEDURE — 97530 THERAPEUTIC ACTIVITIES: CPT

## 2019-08-08 NOTE — THERAPY TREATMENT NOTE
Outpatient Physical Therapy Ortho Treatment Note  [facility]     Patient Name: Olivia Walden  : 1980  MRN: [id number]  Today's Date: 2019      Visit Date: 2019  Pt reports 3/10 pain pre treatment, 3/10 pain post treatment  Reports 75% of improvement.  Attended  9/9 visits.  Insurance available: medical necessity  Next MD appt: TBD .  Recertification: 2019.  Visit Dx:    ICD-10-CM ICD-9-CM   1. Tibia/fibula fracture, right, sequela S82.201S 905.4    S82.401S        Patient Active Problem List   Diagnosis   • Essential hypertension   • Morbid obesity (CMS/HCC)   • Exercise-induced asthma   • Excessive cerumen in left ear canal   • Major depressive disorder with single episode, in full remission (CMS/HCC)   • Ankle sprain   • Atypical Plantar fasciitis   • Decreased hearing of both ears   • Edema   • Chronic pain of left knee   • Primary osteoarthritis of left knee   • Acute radial nerve palsy of left upper extremity        Past Medical History:   Diagnosis Date   • Depression     PHQ score 10, 16   • Hypertension    • Pneumonia         Past Surgical History:   Procedure Laterality Date   • FRACTURE SURGERY Right 2019    ORIF of R tibia   • TONSILLECTOMY         PT Ortho     Row Name 19 1400       Subjective Comments    Subjective Comments  pt still hurting . pt had to work today. pt changing shoes into tennis shoes vs flip flops. PTA encourage pt to wear tennis shoe secondary for better support.  -TL       Precautions and Contraindications    Precautions/Limitations  no known precautions/limitations  -TL       Subjective Pain    Able to rate subjective pain?  yes  -TL    Pre-Treatment Pain Level  3  -TL    Row Name 19 1400       Subjective Comments    Subjective Comments  pt reports that he worked 6 hours today. just got off of work. hurting today. reports that he hasn't gotten his compression socks yet.   -       Precautions and  Contraindications    Precautions/Limitations  no known precautions/limitations  -       Subjective Pain    Able to rate subjective pain?  yes  -    Pre-Treatment Pain Level  -- 5.5-6/10  -    Post-Treatment Pain Level  -- sore  -       Posture/Observations    Posture/Observations Comments  arrives 10 minutes late with mild limp with gait  -      User Key  (r) = Recorded By, (t) = Taken By, (c) = Cosigned By    Initials Name Provider Type     Bridget Mota, MANUEL Physical Therapy Assistant    Harriet Chin PTA Physical Therapy Assistant                      PT Assessment/Plan     Row Name 08/08/19 1600          PT Assessment    Assessment Comments  Pt still with antalagic gait. Pt reports pain with stance phase and push off. Pt had some cramping in the pool with activities . Pt tolerated jogging with DB and Jumping jacks with DB well. Pt pain remained the same. Educated pt on getting better shoes for support and talked to him concerning flip flops.  -TL        PT Plan    PT Frequency  2x/week land/pool  -TL     PT Plan Comments  add resistance walking on land  -       User Key  (r) = Recorded By, (t) = Taken By, (c) = Cosigned By    Initials Name Provider Type    Harriet Chin PTA Physical Therapy Assistant            Exercises     Row Name 08/08/19 1400             Subjective Comments    Subjective Comments  pt still hurting . pt had to work today. pt changing shoes into tennis shoes vs flip flops. PTA encourage pt to wear tennis shoe secondary for better support.  -TL         Subjective Pain    Able to rate subjective pain?  yes  -TL      Pre-Treatment Pain Level  3  -TL      Post-Treatment Pain Level  3  -TL         Exercise 1    Exercise Name 1  Pro II LE's  -TL      Time 1  10mins  -TL      Additional Comments  level 9 for strengthening and ROM  -TL         Exercise 2    Exercise Name 2  incline Gastroc/Soleus S  -TL      Reps 2  2  -TL      Time 2  30 sec hold  -TL         Exercise 3     Exercise Name 3  stepping over hurdles  -TL      Reps 3  5 laps each  -TL      Additional Comments  fwd altern/lateral  -TL         Exercise 4    Exercise Name 4  shuttle press jumping  -TL      Time 4  3mins  -TL      Additional Comments  4 cords  -TL         Exercise 5    Exercise Name 5  shuttle press bouncing  -TL      Time 5  3mins  -TL      Additional Comments  4 cords  -TL         Exercise 6    Exercise Name 6  braiding  -TL      Reps 6  5 laps  -TL         Exercise 7    Exercise Name 7  pivoting  -TL      Reps 7  10 reps on right leg  -TL         Exercise 8    Exercise Name 8  st heelraises   -TL      Reps 8  20  -TL         Exercise 9    Exercise Name 9  aqua;Jog fwd/retro with kickboard   -TL      Time 9  5 mins each  -TL         Exercise 10    Exercise Name 10  aqua; lateral squats  -TL      Time 10  5 mins  -TL         Exercise 11    Exercise Name 11  Aqua; braiding  -TL      Reps 11  4 laps  -TL         Exercise 12    Exercise Name 12  Aqua heel to toe  -TL      Reps 12  3 laps  -TL         Exercise 13    Exercise Name 13  aqua; Lunge walk  -TL      Reps 13  3 laps  -TL         Exercise 14    Exercise Name 14  aqua; skip  -TL      Time 14  5 mins  -TL         Exercise 15    Exercise Name 15  aqua: Fwd Sprint 4 foot to steps and then steps to 4 foot  -TL      Time 15  2laps  -TL         Exercise 16    Exercise Name 16  aqua; jog with DB  -TL      Time 16  3 mins  -TL         Exercise 17    Exercise Name 17  aqua; jumping jacks with DB  -TL      Time 17  3 mins  -TL        User Key  (r) = Recorded By, (t) = Taken By, (c) = Cosigned By    Initials Name Provider Type    Harriet Chin, PTA Physical Therapy Assistant                           Therapy Education  Given: HEP, Symptoms/condition management, Pain management  Program: Reinforced  How Provided: Verbal  Provided to: Patient  Level of Understanding: Verbalized, Demonstrated              Time Calculation:   Start Time: 1434  Stop Time:  1604  Time Calculation (min): 90 min  Total Timed Code Minutes- PT: 90 minute(s)  Therapy Charges for Today     Code Description Service Date Service Provider Modifiers Qty    46708705637  PT THER PROC EA 15 MIN 8/8/2019 Harriet Mai, PTA GP 5    [id number]  PT THERAPEUTIC ACT EA 15 MIN 8/8/2019 Harriet Mai, PTA GP 1                    Harriet Mia PTA  8/8/2019

## 2019-08-13 ENCOUNTER — HOSPITAL ENCOUNTER (OUTPATIENT)
Dept: PHYSICAL THERAPY | Facility: HOSPITAL | Age: 39
Setting detail: THERAPIES SERIES
Discharge: HOME | End: 2019-08-13

## 2019-08-13 DIAGNOSIS — S82.401S TIBIA/FIBULA FRACTURE, RIGHT, SEQUELA: Primary | ICD-10-CM

## 2019-08-13 DIAGNOSIS — S82.201S TIBIA/FIBULA FRACTURE, RIGHT, SEQUELA: Primary | ICD-10-CM

## 2019-08-13 PROCEDURE — 97110 THERAPEUTIC EXERCISES: CPT

## 2019-08-13 NOTE — THERAPY TREATMENT NOTE
Outpatient Physical Therapy Ortho Treatment Note  [facility]  Bridget Mota PTA       Patient Name: Olivia Walden  : 1980  MRN: [id number]  Today's Date: 2019      Visit Date: 2019     Visits: 10/10  Insurance Visits Approved: based on medical necessity  Recert Due: N/A  MD Appt: TBD  Pain: pretreatment 3/10; post treatment 1/10  Improvement: pt is subjectively reporting 75% improvement since initial evaluation    Visit Dx:    ICD-10-CM ICD-9-CM   1. Tibia/fibula fracture, right, sequela S82.201S 905.4    S82.401S        Patient Active Problem List   Diagnosis   • Essential hypertension   • Morbid obesity (CMS/HCC)   • Exercise-induced asthma   • Excessive cerumen in left ear canal   • Major depressive disorder with single episode, in full remission (CMS/HCC)   • Ankle sprain   • Atypical Plantar fasciitis   • Decreased hearing of both ears   • Edema   • Chronic pain of left knee   • Primary osteoarthritis of left knee   • Acute radial nerve palsy of left upper extremity        Past Medical History:   Diagnosis Date   • Depression     PHQ score 10, 16   • Hypertension    • Pneumonia         Past Surgical History:   Procedure Laterality Date   • FRACTURE SURGERY Right 2019    ORIF of R tibia   • TONSILLECTOMY  1988       PT Ortho     Row Name 19 1400       Subjective Comments    Subjective Comments  has been unable to get compression socks at this time but is wearing his compression pants that come all the way down to his ankles. feels like that is helping iwth the swelling some.   -       Precautions and Contraindications    Precautions/Limitations  no known precautions/limitations  -       Subjective Pain    Able to rate subjective pain?  yes  -    Pre-Treatment Pain Level  3  -       Posture/Observations    Posture/Observations Comments  antalgic gait  -      User Key  (r) = Recorded By, (t) = Taken By, (c) = Cosigned By    Initials Name  Provider Type     Bridget Mota PTA Physical Therapy Assistant                          Modalities     Row Name 08/13/19 1400             Ice    Ice Applied  Yes  -MH      Location  ice cup massage to lat knee, patella tendon  -MH      Rx Minutes  -- 8 minutes  -MH        User Key  (r) = Recorded By, (t) = Taken By, (c) = Cosigned By    Initials Name Provider Type     DavismarilynBridget PTA Physical Therapy Assistant        Exercises     Row Name 08/13/19 1400             Subjective Comments    Subjective Comments  has been unable to get compression socks at this time but is wearing his compression pants that come all the way down to his ankles. feels like that is helping iwth the swelling some.   -MH         Subjective Pain    Able to rate subjective pain?  yes  -MH      Pre-Treatment Pain Level  3  -MH      Post-Treatment Pain Level  1  -         Exercise 1    Exercise Name 1  Pro II LE's  -MH      Time 1  10 minutes  -MH      Additional Comments  L 10  -MH         Exercise 2    Exercise Name 2  Incline Gastroc/Soleus S  -MH      Reps 2  2  -MH      Time 2  30 sec hold  -MH         Exercise 3    Exercise Name 3  Reciprocal Stairs  -MH      Reps 3  10  -MH         Exercise 4    Exercise Name 4  8 inch step up fwd  -MH      Reps 4  20  -MH         Exercise 5    Exercise Name 5  8 inch retro step up/fwd step down  -MH      Reps 5  20  -MH         Exercise 6    Exercise Name 6  prone quad S  -MH      Reps 6  3  -MH      Time 6  1 minute  -MH         Exercise 7    Exercise Name 7  Ice cup massage  -MH      Time 7  8 minutes  -MH         Exercise 8    Exercise Name 8  aqua: Fwd/retro alt jog with kickboard   -MH      Time 8  5 minutes  -MH         Exercise 9    Exercise Name 9  aqua: MS with kickboard push/pull  -MH      Time 9  3 minutes  -MH         Exercise 10    Exercise Name 10  aqua: fwd diagonal, lat shuffle, retro diagonal lat shuffle to make X  -MH      Time 10  5 minutes  -MH         Exercise 11     Exercise Name 11  aqua: Fwd lunge walk  -      Time 11  5 minutes  -         Exercise 12    Exercise Name 12  aqua: [place]  -      Time 12  5 minutes  -         Exercise 13    Exercise Name 13  aqua: Jumping Jacks with UE BOx  -      Reps 13  5  -MH      Time 13  1 minute  -MH         Exercise 14    Exercise Name 14  aqua: Heel raises  -      Reps 14  2  -MH      Time 14  1 minute  -MH         Exercise 15    Exercise Name 15  aqua: Toe Raises  -      Reps 15  2  -MH      Time 15  1 minute  -MH         Exercise 16    Exercise Name 16  aqua: High Knees   -      Time 16  5 minutes  -MH         Exercise 17    Exercise Name 17  aqua: Deep Hang  -      Time 17  5 minutes  -        User Key  (r) = Recorded By, (t) = Taken By, (c) = Cosigned By    Initials Name Provider Type    Bridget Lew, PTA Physical Therapy Assistant                       PT OP Goals     Row Name 08/13/19 1400          PT Short Term Goals    STG Date to Achieve  07/31/19  -     STG 1  I with HEP and have additions/changes by next recertification.  -     STG 1 Progress  Met  -     STG 2  AROm R knee 0°->= 115°  -     STG 2 Progress  Met  -     STG 3  R LE 4+/5 or greater for all.  -     STG 3 Progress  Met  -     STG 4  Decrease in R calf measurements by 2cm.  -     STG 4 Progress  Ongoing;Not Met  -     STG 5  AROM R DF >= 6°.  -     STG 5 Progress  Met  -        Long Term Goals    LTG Date to Achieve  08/23/19  -     LTG 1  AROM R erversion >= 30°.  -     LTG 1 Progress  Met  -     LTG 2  B LE 5/5.  -     LTG 2 Progress  Met  -     LTG 3  Patient able to perform 20 sit to/from stand no UE A, = WB B LEs.  -     LTG 3 Progress  Met  -     LTG 4  Patient able to ambulate up/down 3 steps reciprocally no HRA x10.  -     LTG 4 Progress  Met  -     LTG 5  Patient able ot ambulate with no assistive device non-antalgically 1/2 mile.  -     LTG 5 Progress  Partially Met  -     LTG 6  I with  final HEP.  -     LTG 6 Progress  Ongoing  -     LTG 7  Patient able to perform jumping activites with no increas ein pain  -     LTG 7 Progress  New  -     LTG 8  Patient able to perfrom light jogging with no increase in pain.  -     LT 8 Progress  New  -        Time Calculation    PT Goal Re-Cert Due Date  -- N/A  -       User Key  (r) = Recorded By, (t) = Taken By, (c) = Cosigned By    Initials Name Provider Type     Bridget Mota PTA Physical Therapy Assistant                         Time Calculation:   Start Time: 1430  Stop Time: 1608  Time Calculation (min): 98 min  PT Non-Billable Time (min): 8 min  Total Timed Code Minutes- PT: 90 minute(s)  Therapy Charges for Today     Code Description Service Date Service Provider Modifiers Qty    63166864874 HC PT THER PROC EA 15 MIN 8/13/2019 Bridget Mota PTA GP 6    58971418342 HC PT THER SUPP EA 15 MIN 8/13/2019 Bridget Mota PTA GP 1                    Bridget Mota PTA  8/13/2019

## 2019-08-15 ENCOUNTER — HOSPITAL ENCOUNTER (OUTPATIENT)
Dept: PHYSICAL THERAPY | Facility: HOSPITAL | Age: 39
Setting detail: THERAPIES SERIES
Discharge: HOME | End: 2019-08-15

## 2019-08-15 DIAGNOSIS — S82.401S TIBIA/FIBULA FRACTURE, RIGHT, SEQUELA: Primary | ICD-10-CM

## 2019-08-15 DIAGNOSIS — S82.201S TIBIA/FIBULA FRACTURE, RIGHT, SEQUELA: Primary | ICD-10-CM

## 2019-08-15 PROCEDURE — 97110 THERAPEUTIC EXERCISES: CPT

## 2019-08-15 NOTE — THERAPY TREATMENT NOTE
Outpatient Physical Therapy Ortho Treatment Note  [facility]  Bridget Mota PTA       Patient Name: Olivia Walden  : 1980  MRN: [id number]  Today's Date: 8/15/2019      Visit Date: 08/15/2019     Visits:   Insurance Visits Approved: based on medical necessity  Recert Due: N/A  MD Appt: TBD  Pain: pretreatment 2-3/10; post treatment 3/10  Improvement: pt is subjectively reporting 80% improvement since initial evaluation    Visit Dx:    ICD-10-CM ICD-9-CM   1. Tibia/fibula fracture, right, sequela S82.201S 905.4    S82.401S        Patient Active Problem List   Diagnosis   • Essential hypertension   • Morbid obesity (CMS/HCC)   • Exercise-induced asthma   • Excessive cerumen in left ear canal   • Major depressive disorder with single episode, in full remission (CMS/HCC)   • Ankle sprain   • Atypical Plantar fasciitis   • Decreased hearing of both ears   • Edema   • Chronic pain of left knee   • Primary osteoarthritis of left knee   • Acute radial nerve palsy of left upper extremity        Past Medical History:   Diagnosis Date   • Depression     PHQ score 10, 16   • Hypertension    • Pneumonia         Past Surgical History:   Procedure Laterality Date   • FRACTURE SURGERY Right 2019    ORIF of R tibia   • TONSILLECTOMY  1988       PT Ortho     Row Name 08/15/19 1400       Subjective Comments    Subjective Comments  sore today. work was work  -       Precautions and Contraindications    Precautions/Limitations  no known precautions/limitations  -       Subjective Pain    Able to rate subjective pain?  yes  -    Pre-Treatment Pain Level  -- 2-310  -    Row Name 19 1400       Subjective Comments    Subjective Comments  has been unable to get compression socks at this time but is wearing his compression pants that come all the way down to his ankles. feels like that is helping iwth the swelling some.   -       Precautions and Contraindications     Precautions/Limitations  no known precautions/limitations  -       Subjective Pain    Able to rate subjective pain?  yes  -    Pre-Treatment Pain Level  3  -       Posture/Observations    Posture/Observations Comments  antalgic gait  -      User Key  (r) = Recorded By, (t) = Taken By, (c) = Cosigned By    Initials Name Provider Type     Bridget Mota PTA Physical Therapy Assistant                      PT Assessment/Plan     Row Name 08/15/19 1600          PT Assessment    Assessment Comments  patient did well with no increase in complaints of pain. during treatment patient appears sluggish and is asked about nutrition intake this date. sandipeitn has not eaten since morning hours. patient is given a rest break and penut butter and cheese crackers and water to snack on before resuming treatment.   -        PT Plan    PT Frequency  2x/week land/pool  -     PT Plan Comments  continue EFX, may consider light jog if tolerates  -       User Key  (r) = Recorded By, (t) = Taken By, (c) = Cosigned By    Initials Name Provider Type     Bridget Mota PTA Physical Therapy Assistant            Exercises     Row Name 08/15/19 1400             Subjective Comments    Subjective Comments  sore today. work was work  -         Subjective Pain    Able to rate subjective pain?  yes  -      Pre-Treatment Pain Level  -- 2-3/10  -      Post-Treatment Pain Level  3  -         Exercise 1    Exercise Name 1  EFX  -      Time 1  10 minutes  -         Exercise 2    Exercise Name 2  B Incline Gastroc/Soleus S  -      Reps 2  2  -      Time 2  30 sec hold  -         Exercise 3    Exercise Name 3  BOSU Step Up and over Fwd, lat  -      Time 3  3 minutes each  -         Exercise 4    Exercise Name 4  [place]  -      Time 4  5 minutes  -         Exercise 5    Exercise Name 5  Shuttle 2L jump  -      Time 5  5 minutes  -      Additional Comments  7 cords  -         Exercise 6    Exercise Name 6   Shuttle bounding  -      Time 6  3 minutes  -      Additional Comments  5 cords  -         Exercise 7    Exercise Name 7  aqua: Jog fwd/retro lrg kickboard  -MH      Time 7  5 minutes  -         Exercise 8    Exercise Name 8  aqua: Lat shuffle large kickboard  -MH      Time 8  5 minutes  -         Exercise 9    Exercise Name 9  aqua: Jumping Jacks with Lrg Yellow Boxes  -MH      Reps 9  5  -MH      Time 9  1 minute  -MH         Exercise 10    Exercise Name 10  aqua: HR and TR  -MH      Reps 10  5  -MH      Time 10  1 minute each  -         Exercise 11    Exercise Name 11  aqua: Stop and Go multi direction changes  -MH      Reps 11  5  -MH      Time 11  1 minute  -         Exercise 12    Exercise Name 12  aqua: Fwd lunge walk  -MH      Time 12  5 minutes  -         Exercise 13    Exercise Name 13  aqua: [place]  -      Reps 13  5 minutes  -        User Key  (r) = Recorded By, (t) = Taken By, (c) = Cosigned By    Initials Name Provider Type     Bridget Mota, PTA Physical Therapy Assistant                       PT OP Goals     Row Name 08/15/19 1400          PT Short Term Goals    STG Date to Achieve  07/31/19  -     STG 1  I with HEP and have additions/changes by next recertification.  -     STG 1 Progress  Met  -     STG 2  AROm R knee 0°->= 115°  -     STG 2 Progress  Met  [facility]     STG 3  R LE 4+/5 or greater for all.  -     STG 3 Progress  Met  -     STG 4  Decrease in R calf measurements by 2cm.  -     STG 4 Progress  Ongoing;Not Met  -     STG 5  AROM R DF >= 6°.  -     STG 5 Progress  Met  -        Long Term Goals    LTG Date to Achieve  08/23/19  -     LTG 1  AROM R erversion >= 30°.  -     LTG 1 Progress  Met  -     LTG 2  B LE 5/5.  -     LTG 2 Progress  Met  -     LTG 3  Patient able to perform 20 sit to/from stand no UE A, = WB B LEs.  -     LTG 3 Progress  Met  -     LTG 4  Patient able to ambulate up/down 3 steps reciprocally no HRA x10.  -      LTG 4 Progress  Met  -     LTG 5  Patient able ot ambulate with no assistive device non-antalgically 1/2 mile.  -     LTG 5 Progress  Partially Met  -     LTG 6  [facility]  [facility]  -     LTG 7  Patient able to perform jumping activites with no increas ein pain  -     LTG 7 Progress  New  -     LTG 8  Patient able to perfrom light jogging with no increase in pain.  -     LTG 8 Progress  New  -        Time Calculation    PT Goal Re-Cert Due Date  -- N/A  -       User Key  (r) = Recorded By, (t) = Taken By, (c) = Cosigned By    Initials Name Provider Type     Bridget Mota PTA Physical Therapy Assistant                         Time Calculation:   Start Time: 1437  Stop Time: 1638  Time Calculation (min): 121 min  PT Non-Billable Time (min): 5 min(rest break to eat a snack)  Total Timed Code Minutes- PT: 116 minute(s)  Therapy Charges for Today     Code Description Service Date Service Provider Modifiers Qty    92478951231 HC PT THER PROC EA 15 MIN 8/15/2019 Bridget Mota PTA GP 8    54949654592 HC PT THER SUPP EA 15 MIN 8/15/2019 Bridget Mota PTA GP 1                    Bridget Mota PTA  8/15/2019

## 2019-08-20 ENCOUNTER — HOSPITAL ENCOUNTER (OUTPATIENT)
Dept: PHYSICAL THERAPY | Facility: HOSPITAL | Age: 39
Setting detail: THERAPIES SERIES
Discharge: HOME | End: 2019-08-20

## 2019-08-20 DIAGNOSIS — S82.201S TIBIA/FIBULA FRACTURE, RIGHT, SEQUELA: Primary | ICD-10-CM

## 2019-08-20 DIAGNOSIS — S82.401S TIBIA/FIBULA FRACTURE, RIGHT, SEQUELA: Primary | ICD-10-CM

## 2019-08-20 PROCEDURE — 97110 THERAPEUTIC EXERCISES: CPT

## 2019-08-20 NOTE — THERAPY TREATMENT NOTE
Outpatient Physical Therapy Ortho Treatment Note  [facility]  Bridget Mota PTA       Patient Name: Olivia Walden  : 1980  MRN: [id number]  Today's Date: 2019      Visit Date: 2019     Visits:   Insurance Visits Approved: based on medical necessity  Recert Due: N/A  MD Appt: TBD  Pain: pretreatment 1/10; post treatment 1-2/10  Improvement: pt is subjectively reporting 80% improvement since initial evaluation    Visit Dx:    ICD-10-CM ICD-9-CM   1. Tibia/fibula fracture, right, sequela S82.201S 905.4    S82.401S        Patient Active Problem List   Diagnosis   • Essential hypertension   • Morbid obesity (CMS/HCC)   • Exercise-induced asthma   • Excessive cerumen in left ear canal   • Major depressive disorder with single episode, in full remission (CMS/HCC)   • Ankle sprain   • Atypical Plantar fasciitis   • Decreased hearing of both ears   • Edema   • Chronic pain of left knee   • Primary osteoarthritis of left knee   • Acute radial nerve palsy of left upper extremity        Past Medical History:   Diagnosis Date   • Depression     PHQ score 10, 16   • Hypertension    • Pneumonia         Past Surgical History:   Procedure Laterality Date   • FRACTURE SURGERY Right 2019    ORIF of R tibia   • TONSILLECTOMY         PT Ortho     Row Name 19 1400       Subjective Comments    Subjective Comments  pt reports that he is doing well today. reports that he has lower pain right now. states that it did hurt a little more while he was at work.   -       Precautions and Contraindications    Precautions/Limitations  no known precautions/limitations  -       Subjective Pain    Able to rate subjective pain?  yes  -    Pre-Treatment Pain Level  1  -    Post-Treatment Pain Level  -- 1-2/10  -      User Key  (r) = Recorded By, (t) = Taken By, (c) = Cosigned By    Initials Name Provider Type     Bridget Mota PTA Physical Therapy Assistant                       PT Assessment/Plan     Row Name 08/20/19 1400          PT Assessment    Assessment Comments  patient is able to tolerate some jumping activities on land, but not others. able to tolerate all jumping activities in the pool. able to perform all jogging activities in the pool at this time.   -        PT Plan    PT Frequency  2x/week land/pool  -     PT Plan Comments  light jogging on land next visit  -       User Key  (r) = Recorded By, (t) = Taken By, (c) = Cosigned By    Initials Name Provider Type     Bridget Mota PTA Physical Therapy Assistant            Exercises     Row Name 08/20/19 1400             Subjective Comments    Subjective Comments  pt reports that he is doing well today. reports that he has lower pain right now. states that it did hurt a little more while he was at work.   -         Subjective Pain    Able to rate subjective pain?  yes  -      Pre-Treatment Pain Level  1  -      Post-Treatment Pain Level  -- 1-2/10  -         Exercise 1    Exercise Name 1  EFX  -      Time 1  10 minutes  -         Exercise 2    Exercise Name 2  B Incline Gastroc/Soleus S  -      Reps 2  2  -      Time 2  30 sec hold  -         Exercise 3    Exercise Name 3  Jump Rope  -      Reps 3  4  -      Time 3  30 sec hold  -         Exercise 4    Exercise Name 4  High Knees  -      Reps 4  1 lap  -         Exercise 5    Exercise Name 5  Lat Squat Stepping  -      Time 5  5 mins  -      Additional Comments  4 cords  -         Exercise 6    Exercise Name 6  Shuttle Bounding  -      Time 6  5 minutes  -      Additional Comments  5 cords  -         Exercise 7    Exercise Name 7  BOSU alt Lunge with push off  -      Time 7  5 minutes  -         Exercise 8    Exercise Name 8  aqua: Wall quick step kicks  -      Time 8  5 minutes  -         Exercise 9    Exercise Name 9  aqua: Fwd/Retro jog w/ lrg kicboard  -      Time 9  5 minutes  -         Exercise 10     Exercise Name 10  aqua: Fast mini squat w/ kb push/pull  -      Reps 10  2  -MH      Time 10  2 minutes   -         Exercise 11    Exercise Name 11  aqua: HR/TR  -      Reps 11  2  -      Time 11  1 minute each  -         Exercise 12    Exercise Name 12  aqua: Jumping jacks Yellow Box  -      Reps 12  5  -MH      Time 12  1 minute  -         Exercise 13    Exercise Name 13  aqua: Jogging in place with Yellow Box  -      Reps 13  5  -MH      Time 13  1 minute  -        User Key  (r) = Recorded By, (t) = Taken By, (c) = Cosigned By    Initials Name Provider Type     Bridget Mota, PTA Physical Therapy Assistant                       PT OP Goals     Row Name 08/20/19 1400          PT Short Term Goals    STG Date to Achieve  07/31/19  -     STG 1  I with HEP and have additions/changes by next recertification.  -     STG 1 Progress  Met  -     STG 2  AROm R knee 0°->= 115°  -     STG 2 Progress  Met  [facility]     STG 3  R LE 4+/5 or greater for all.  -     STG 3 Progress  Met  -     STG 4  Decrease in R calf measurements by 2cm.  -     STG 4 Progress  Ongoing;Not Met  -     STG 5  AROM R DF >= 6°.  -     STG 5 Progress  Met  -        Long Term Goals    LTG Date to Achieve  08/23/19  -     LTG 1  AROM R erversion >= 30°.  -     LTG 1 Progress  Met  -     LTG 2  B LE 5/5.  -     LTG 2 Progress  Met  -     LTG 3  Patient able to perform 20 sit to/from stand no UE A, = WB B LEs.  -     LTG 3 Progress  Met  -     LTG 4  Patient able to ambulate up/down 3 steps reciprocally no HRA x10.  -     LTG 4 Progress  Met  -     LTG 5  Patient able ot ambulate with no assistive device non-antalgically 1/2 mile.  -     LTG 5 Progress  Partially Met  [facility]     LTG 6  I with final HEP.  -     LTG 6 Progress  Ongoing  -     LTG 7  Patient able to perform jumping activites with no increas ein pain  -     LTG 7 Progress  Partially Met  [facility]     LTG 7 Progress Comments  able to  tolerate some jumping activities on land and no difficulty in pool  -     LTG 8  Patient able to perfrom light jogging with no increase in pain.  -     LTG 8 Progress  Partially Met  -     LTG 8 Progress Comments  able to tolerate light jogging in the pool  -        Time Calculation    PT Goal Re-Cert Due Date  -- N/A  -       User Key  (r) = Recorded By, (t) = Taken By, (c) = Cosigned By    Initials Name Provider Type     Bridget Mota PTA Physical Therapy Assistant          Therapy Education  Given: HEP, Symptoms/condition management, Pain management  Program: Reinforced  How Provided: Verbal  Provided to: Patient  Level of Understanding: Verbalized, Demonstrated              Time Calculation:   Start Time: 1440  Stop Time: 1630  Time Calculation (min): 110 min  PT Non-Billable Time (min): 5 min(time to shower and change)  Total Timed Code Minutes- PT: 105 minute(s)  Therapy Charges for Today     Code Description Service Date Service Provider Modifiers Qty    19757864541 HC PT THER PROC EA 15 MIN 8/20/2019 Bridget Mota PTA GP 7    [id number] HC PT THER SUPP EA 15 MIN 8/20/2019 Bridget Mota PTA GP 1                    Bridget Mota PTA  8/20/2019

## 2019-08-22 ENCOUNTER — HOSPITAL ENCOUNTER (OUTPATIENT)
Dept: PHYSICAL THERAPY | Facility: HOSPITAL | Age: 39
Setting detail: THERAPIES SERIES
Discharge: HOME | End: 2019-08-22

## 2019-08-22 DIAGNOSIS — S82.201S TIBIA/FIBULA FRACTURE, RIGHT, SEQUELA: Primary | ICD-10-CM

## 2019-08-22 DIAGNOSIS — S82.401S TIBIA/FIBULA FRACTURE, RIGHT, SEQUELA: Primary | ICD-10-CM

## 2019-08-22 PROCEDURE — 97110 THERAPEUTIC EXERCISES: CPT

## 2019-08-22 NOTE — THERAPY TREATMENT NOTE
Outpatient Physical Therapy Ortho Treatment Note  [facility]  Bridget Mota PTA       Patient Name: Olivia Walden  : 1980  MRN: [id number]  Today's Date: 2019      Visit Date: 2019     Visits:   Insurance Visits Approved: based on medical necessity  Recert Due: N/A  MD Appt: TBD  Pain: pretreatment 4/10; post treatment -3/10  Improvement: pt is subjectively reporting 80% improvement since initial evaluation    Visit Dx:    ICD-10-CM ICD-9-CM   1. Tibia/fibula fracture, right, sequela S82.201S 905.4    S82.401S        Patient Active Problem List   Diagnosis   • Essential hypertension   • Morbid obesity (CMS/HCC)   • Exercise-induced asthma   • Excessive cerumen in left ear canal   • Major depressive disorder with single episode, in full remission (CMS/HCC)   • Ankle sprain   • Atypical Plantar fasciitis   • Decreased hearing of both ears   • Edema   • Chronic pain of left knee   • Primary osteoarthritis of left knee   • Acute radial nerve palsy of left upper extremity        Past Medical History:   Diagnosis Date   • Depression     PHQ score 10, 16   • Hypertension    • Pneumonia         Past Surgical History:   Procedure Laterality Date   • FRACTURE SURGERY Right 2019    ORIF of R tibia   • TONSILLECTOMY         PT Ortho     Row Name 19 1400       Subjective Comments    Subjective Comments  states that he is hurting more today than he was the other day. reports that work is ok.   -       Precautions and Contraindications    Precautions/Limitations  no known precautions/limitations  -       Subjective Pain    Able to rate subjective pain?  yes  -    Pre-Treatment Pain Level  4  -    Row Name 19 1400       Subjective Comments    Subjective Comments  pt reports that he is doing well today. reports that he has lower pain right now. states that it did hurt a little more while he was at work.   -       Precautions and  Contraindications    Precautions/Limitations  no known precautions/limitations  -       Subjective Pain    Able to rate subjective pain?  yes  -    Pre-Treatment Pain Level  1  -    Post-Treatment Pain Level  -- 1-2/10  -      User Key  (r) = Recorded By, (t) = Taken By, (c) = Cosigned By    Initials Name Provider Type     Bridget Mota PTA Physical Therapy Assistant                      PT Assessment/Plan     Row Name 08/22/19 1400          PT Assessment    Assessment Comments  patient has complaints with impact activies on land today. tolerates it a lot better in the pool today.   -        PT Plan    PT Frequency  2x/week land/pool  -     PT Plan Comments  progress toward final HEP with anticipation of DC at end of next week.   -       User Key  (r) = Recorded By, (t) = Taken By, (c) = Cosigned By    Initials Name Provider Type     Bridget Mota PTA Physical Therapy Assistant          Modalities     Row Name 08/22/19 1400             Ice    Ice Applied  Yes to go  -      Location  B Knees  -      Ice S/P Rx  Yes  -        User Key  (r) = Recorded By, (t) = Taken By, (c) = Cosigned By    Initials Name Provider Type     Bridget Mota PTA Physical Therapy Assistant        Exercises     Row Name 08/22/19 1400             Subjective Comments    Subjective Comments  states that he is hurting more today than he was the other day. reports that work is ok.   -         Subjective Pain    Able to rate subjective pain?  yes  -      Pre-Treatment Pain Level  4  -      Post-Treatment Pain Level  -- 2-3/10  -         Exercise 1    Exercise Name 1  EFX  -      Time 1  10 minutes  -         Exercise 2    Exercise Name 2  B Incline Gasroc/Soleus S  -      Reps 2  2  -      Time 2  30 sec hold  -         Exercise 3    Exercise Name 3  Jogging  -      Sets 3  2  -MH      Reps 3  110 feet  -         Exercise 4    Exercise Name 4  BOSU Step Up and Over Fwd and Lat  -      Reps 4   20 each  -         Exercise 5    Exercise Name 5  Shuttle Bounding  -      Time 5  5 minutes  -      Additional Comments  7 cords  -         Exercise 6    Exercise Name 6  aqua: Jogging   -      Time 6  10 minutes  -        User Key  (r) = Recorded By, (t) = Taken By, (c) = Cosigned By    Initials Name Provider Type    Bridget Lew PTA Physical Therapy Assistant                       PT OP Goals     Row Name 08/22/19 1400          PT Short Term Goals    STG Date to Achieve  07/31/19  -     STG 1  I with HEP and have additions/changes by next recertification.  -     STG 1 Progress  Met  -     STG 2  AROm R knee 0°->= 115°  -     STG 2 Progress  Met  [facility]     STG 3  R LE 4+/5 or greater for all.  -     STG 3 Progress  Met  -     STG 4  Decrease in R calf measurements by 2cm.  -     STG 4 Progress  Ongoing;Not Met  -     STG 5  AROM R DF >= 6°.  -     STG 5 Progress  Met  [facility]        Long Term Goals    LTG Date to Achieve  08/23/19  -     LTG 1  AROM R erversion >= 30°.  -     LTG 1 Progress  Met  -     LTG 2  B LE 5/5.  -     LTG 2 Progress  Met  -     LTG 3  Patient able to perform 20 sit to/from stand no UE A, = WB B LEs.  -     LTG 3 Progress  Met  -     LTG 4  Patient able to ambulate up/down 3 steps reciprocally no HRA x10.  -     LTG 4 Progress  Met  -     LTG 5  Patient able ot ambulate with no assistive device non-antalgically 1/2 mile.  -     LTG 5 Progress  Partially Met  [facility]     LTG 6  I with final HEP.  -     LTG 6 Progress  Ongoing  -     LTG 7  Patient able to perform jumping activites with no increas ein pain  -     LTG 7 Progress  Partially Met  [facility]     LTG 8  Patient able to perfrom light jogging with no increase in pain.  -     LTG 8 Progress  Partially Met  [facility]        Time Calculation    PT Goal Re-Cert Due Date  -- N/A  -       User Key  (r) = Recorded By, (t) = Taken By, (c) = Cosigned By    Initials Name Provider Type    SYED Mota  Bridget FLANNERY PTA Physical Therapy Assistant          Therapy Education  Given: HEP, Symptoms/condition management, Pain management              Time Calculation:   Start Time: 1445  Stop Time: 1610  Time Calculation (min): 85 min  Total Timed Code Minutes- PT: 85 minute(s)  Therapy Charges for Today     Code Description Service Date Service Provider Modifiers Qty    11122295307 HC PT THER PROC EA 15 MIN 8/22/2019 Bridget Mota PTA  6    79224270803 HC PT THER SUPP EA 15 MIN 8/22/2019 Bridget Mota PTA GP 1                    Bridget Mota PTA  8/22/2019

## 2019-08-26 ENCOUNTER — APPOINTMENT (OUTPATIENT)
Dept: PHYSICAL THERAPY | Facility: HOSPITAL | Age: 39
End: 2019-08-26

## 2019-08-27 ENCOUNTER — HOSPITAL ENCOUNTER (OUTPATIENT)
Dept: PHYSICAL THERAPY | Facility: HOSPITAL | Age: 39
Setting detail: THERAPIES SERIES
Discharge: HOME | End: 2019-08-27

## 2019-08-27 DIAGNOSIS — S82.401S TIBIA/FIBULA FRACTURE, RIGHT, SEQUELA: Primary | ICD-10-CM

## 2019-08-27 DIAGNOSIS — S82.201S TIBIA/FIBULA FRACTURE, RIGHT, SEQUELA: Primary | ICD-10-CM

## 2019-08-27 PROCEDURE — 97110 THERAPEUTIC EXERCISES: CPT

## 2019-08-27 NOTE — THERAPY TREATMENT NOTE
Outpatient Physical Therapy Ortho Treatment Note  [facility]  Bridget Mota PTA       Patient Name: Olivia Walden  : 1980  MRN: [id number]  Today's Date: 2019      Visit Date: 2019     Visits:   Insurance Visits Approved: based on medical necessity  Recert Due: N/A  MD Appt: TBD  Pain: pretreatment 2/10; post treatment 2/10  Improvement: pt is subjectively reporting 80% improvement since initial evaluation    Visit Dx:    ICD-10-CM ICD-9-CM   1. Tibia/fibula fracture, right, sequela S82.201S 905.4    S82.401S        Patient Active Problem List   Diagnosis   • Essential hypertension   • Morbid obesity (CMS/HCC)   • Exercise-induced asthma   • Excessive cerumen in left ear canal   • Major depressive disorder with single episode, in full remission (CMS/HCC)   • Ankle sprain   • Atypical Plantar fasciitis   • Decreased hearing of both ears   • Edema   • Chronic pain of left knee   • Primary osteoarthritis of left knee   • Acute radial nerve palsy of left upper extremity        Past Medical History:   Diagnosis Date   • Depression     PHQ score 10, 16   • Hypertension    • Pneumonia         Past Surgical History:   Procedure Laterality Date   • FRACTURE SURGERY Right 2019    ORIF of R tibia   • TONSILLECTOMY         PT Ortho     Row Name 19 1400       Precautions and Contraindications    Precautions/Limitations  no known precautions/limitations  -       Subjective Pain    Post-Treatment Pain Level  2  -      User Key  (r) = Recorded By, (t) = Taken By, (c) = Cosigned By    Initials Name Provider Type     Bridget Mota PTA Physical Therapy Assistant                      PT Assessment/Plan     Row Name 19 1400          PT Assessment    Assessment Comments  no aquatics todya secondary to pool being closed for mantainence. did well with activities on land todya. does have some pain with exercises but is able to complete all that is  asked of him.   -        PT Plan    PT Frequency  2x/week  -     PT Plan Comments  discharge at next visit with free fitness membership and independent program  -       User Key  (r) = Recorded By, (t) = Taken By, (c) = Cosigned By    Initials Name Provider Type     Bridget Mota PTA Physical Therapy Assistant          Modalities     Row Name 08/27/19 1400             Subjective Comments    Subjective Comments  patient states that he saw the doctor today. reports that the doctor says that his leg is doing good. reports that the doctor has told him he will be able to go back to playing football and that the bone will be stronger than ever.   -         Ice    Ice Applied  Yes  -      Location  R knee  -      Rx Minutes  15 mins  -      Ice S/P Rx  Yes  -        User Key  (r) = Recorded By, (t) = Taken By, (c) = Cosigned By    Initials Name Provider Type     Bridget Mota PTA Physical Therapy Assistant        Exercises     Row Name 08/27/19 1400             Subjective Comments    Subjective Comments  patient states that he saw the doctor today. reports that the doctor says that his leg is doing good. reports that the doctor has told him he will be able to go back to playing football and that the bone will be stronger than ever.   -         Subjective Pain    Able to rate subjective pain?  yes  -      Pre-Treatment Pain Level  2  -      Post-Treatment Pain Level  2  -         Exercise 1    Exercise Name 1  EFX  -      Time 1  15 minutes  -         Exercise 2    Exercise Name 2  B Incline Gastroc/Soleus S  -      Reps 2  2  -      Time 2  30 sec hold  -         Exercise 3    Exercise Name 3  Jogging  -      Reps 3  275 feet  -         Exercise 4    Exercise Name 4  Stop and Go directional changes  -      Time 4  5 minutes  -         Exercise 5    Exercise Name 5  lat shuffle across gym  -      Reps 5  2 laps  -         Exercise 6    Exercise Name 6  cairo[place] across gym   -      Reps 6  2 laps  -         Exercise 7    Exercise Name 7  Track Walk  -      Reps 7  1 lap  -         Exercise 8    Exercise Name 8  BOSU Lunge with push off alternating LE's  -      Time 8  4 minutes  -         Exercise 9    Exercise Name 9  BOSU Mini Squat  -      Reps 9  20  -         Exercise 10    Exercise Name 10  BOSU Step Ups Fwd alternating  -      Time 10  4 minutes  -         Exercise 11    Exercise Name 11  low leora sit to/from stand  -      Reps 11  10  -        User Key  (r) = Recorded By, (t) = Taken By, (c) = Cosigned By    Initials Name Provider Type    Bridget Lew, PTA Physical Therapy Assistant                       PT OP Goals     Row Name 08/27/19 1400          PT Short Term Goals    STG Date to Achieve  07/31/19  -     STG 1  I with HEP and have additions/changes by next recertification.  -     STG 1 Progress  Met  -     STG 2  AROm R knee 0°->= 115°  -     STG 2 Progress  Met  [facility]     STG 3  R LE 4+/5 or greater for all.  -     STG 3 Progress  Met  [facility]     STG 4  Decrease in R calf measurements by 2cm.  -     STG 4 Progress  Ongoing;Not Met  -     STG 5  AROM R DF >= 6°.  -     STG 5 Progress  Met  -        Long Term Goals    LTG Date to Achieve  08/23/19  -     LTG 1  AROM R erversion >= 30°.  -     LTG 1 Progress  Met  -     LTG 2  B LE 5/5.  -     LTG 2 Progress  Met  -     LTG 3  Patient able to perform 20 sit to/from stand no UE A, = WB B LEs.  -     LTG 3 Progress  Met  -     LTG 4  Patient able to ambulate up/down 3 steps reciprocally no HRA x10.  -     LTG 4 Progress  Met  [facility]     LTG 5  Patient able ot ambulate with no assistive device non-antalgically 1/2 mile.  -     LTG 5 Progress  Partially Met  [facility]     LTG 6  I with final HEP.  -     LTG 6 Progress  Ongoing  -     LTG 7  Patient able to perform jumping activites with no increas ein pain  -     LTG 7 Progress  Partially Met  [facility]     LTG 8  Patient able  to perfrom light jogging with no increase in pain.  -     LTG 8 Progress  Partially Met  -        Time Calculation    PT Goal Re-Cert Due Date  -- N/A  -       User Key  (r) = Recorded By, (t) = Taken By, (c) = Cosigned By    Initials Name Provider Type     Bridget Mota PTA Physical Therapy Assistant          Therapy Education  Given: HEP, Symptoms/condition management, Pain management  Program: Reinforced  How Provided: Verbal, Demonstration  Provided to: Patient  Level of Understanding: Verbalized, Demonstrated              Time Calculation:   Start Time: 1440  Stop Time: 1600  Time Calculation (min): 80 min  PT Non-Billable Time (min): 15 min  Total Timed Code Minutes- PT: 65 minute(s)  Therapy Charges for Today     Code Description Service Date Service Provider Modifiers Qty    68402973895 HC PT THER PROC EA 15 MIN 8/27/2019 Bridget Mota PTA GP 4    96449885192 HC PT THER SUPP EA 15 MIN 8/27/2019 Bridget Mota PTA GP 1                    Bridget Mota PTA  8/27/2019

## 2019-08-28 ENCOUNTER — APPOINTMENT (OUTPATIENT)
Dept: PHYSICAL THERAPY | Facility: HOSPITAL | Age: 39
End: 2019-08-28

## 2019-08-29 ENCOUNTER — HOSPITAL ENCOUNTER (OUTPATIENT)
Dept: PHYSICAL THERAPY | Facility: HOSPITAL | Age: 39
Setting detail: THERAPIES SERIES
Discharge: HOME | End: 2019-08-29

## 2019-08-29 DIAGNOSIS — S82.401S TIBIA/FIBULA FRACTURE, RIGHT, SEQUELA: Primary | ICD-10-CM

## 2019-08-29 DIAGNOSIS — S82.201S TIBIA/FIBULA FRACTURE, RIGHT, SEQUELA: Primary | ICD-10-CM

## 2019-08-29 PROCEDURE — 97110 THERAPEUTIC EXERCISES: CPT

## 2019-08-29 NOTE — THERAPY DISCHARGE NOTE
Outpatient Physical Therapy Ortho Treatment Note/Discharge Summary  [facility]  Bridget Mota PTA       Patient Name: Olivia Walden  : 1980  MRN: [id number]  Today's Date: 2019      Visit Date: 2019     Visits: 15/15  Insurance Visits Approved: based on medical necessity  Recert Due: N/A  MD Appt: TBD  Pain: pretreatment 1/10; post treatment 2/10  Improvement: pt is subjectively reporting 80% improvement since initial evaluation    Visit Dx:    ICD-10-CM ICD-9-CM   1. Tibia/fibula fracture, right, sequela S82.201S 905.4    S82.401S        Patient Active Problem List   Diagnosis   • Essential hypertension   • Morbid obesity (CMS/HCC)   • Exercise-induced asthma   • Excessive cerumen in left ear canal   • Major depressive disorder with single episode, in full remission (CMS/HCC)   • Ankle sprain   • Atypical Plantar fasciitis   • Decreased hearing of both ears   • Edema   • Chronic pain of left knee   • Primary osteoarthritis of left knee   • Acute radial nerve palsy of left upper extremity        Past Medical History:   Diagnosis Date   • Depression     PHQ score 10, 16   • Hypertension    • Pneumonia         Past Surgical History:   Procedure Laterality Date   • FRACTURE SURGERY Right 2019    ORIF of R tibia   • TONSILLECTOMY  1988       PT Ortho     Row Name 19 1400       Subjective Comments    Subjective Comments  states that he is doing well today.   -       Precautions and Contraindications    Precautions/Limitations  no known precautions/limitations  -       Subjective Pain    Able to rate subjective pain?  yes  -    Pre-Treatment Pain Level  1  -    Post-Treatment Pain Level  2  -    Row Name 19 1400       Precautions and Contraindications    Precautions/Limitations  no known precautions/limitations  -       Subjective Pain    Post-Treatment Pain Level  2  -      User Key  (r) = Recorded By, (t) = Taken By, (c) = Cosigned  By    Initials Name Provider Type     Bridget Mota PTA Physical Therapy Assistant                      PT Assessment/Plan     Row Name 08/29/19 1400          PT Assessment    Assessment Comments  patient able to complete a whole lap on land jogging around the track. good effort throughout. patient demonstrates ability to complete and continue independent fitness   -        PT Plan    PT Frequency  2x/week  -     PT Plan Comments  discharge to independent HEP  -       User Key  (r) = Recorded By, (t) = Taken By, (c) = Cosigned By    Initials Name Provider Type     Bridget Mota PTA Physical Therapy Assistant          Modalities     Row Name 08/29/19 1400             Ice    Ice Applied  Yes  -      Location  R knee  -MH      Rx Minutes  15 mins  -      Ice S/P Rx  Yes  -        User Key  (r) = Recorded By, (t) = Taken By, (c) = Cosigned By    Initials Name Provider Type     Bridget Mota PTA Physical Therapy Assistant          Exercises     Row Name 08/29/19 1400             Subjective Comments    Subjective Comments  states that he is doing well today.   -         Subjective Pain    Able to rate subjective pain?  yes  -      Pre-Treatment Pain Level  1  -      Post-Treatment Pain Level  2  -         Exercise 1    Exercise Name 1  EFX  -      Time 1  15 minutes  -      Additional Comments  for endurance and cardio  -         Exercise 2    Exercise Name 2  B Incline Gastroc/Soleus S  -MH      Reps 2  2  -MH      Time 2  30 sec hold  -MH         Exercise 3    Exercise Name 3  Fast Step  -      Reps 3  4  -MH      Time 3  30 secs  -         Exercise 4    Exercise Name 4  Shuttle Bounding  -      Time 4  3 minutes  -      Additional Comments  7 cords  -         Exercise 5    Exercise Name 5  B St. Quad S  -      Reps 5  2  -MH      Time 5  30 sec hold each  -         Exercise 6    Exercise Name 6  BOSU Lunge Push Off  -      Time 6  4 minutes alternating legs  -          Exercise 7    Exercise Name 7  Airx Plyotoss 2L/1L  -      Sets 7  2  -      Reps 7  20  -         Exercise 8    Exercise Name 8  Inverted BOSU mini squat  -      Reps 8  20  -      Additional Comments  started with HHA, after 8 reps completes remainder no hands  -         Exercise 9    Exercise Name 9  Track Jogging  -      Reps 9  1 lap (330 feet  -         Exercise 10    Exercise Name 10  Jump Roping  -      Reps 10  3  -      Time 10  30 sec hold  -        User Key  (r) = Recorded By, (t) = Taken By, (c) = Cosigned By    Initials Name Provider Type    Bridget Lew, PTA Physical Therapy Assistant                         PT OP Goals     Row Name 08/29/19 1400          PT Short Term Goals    STG Date to Achieve  07/31/19  -     STG 1  I with HEP and have additions/changes by next recertification.  -     STG 1 Progress  Met  [facility]     STG 2  AROm R knee 0°->= 115°  -     STG 2 Progress  Met  [facility]     STG 3  R LE 4+/5 or greater for all.  -     STG 3 Progress  Met  [facility]     STG 4  Decrease in R calf measurements by 2cm.  -     STG 4 Progress  Ongoing;Not Met  -     STG 5  AROM R DF >= 6°.  -     STG 5 Progress  Met  [facility]        Long Term Goals    LTG Date to Achieve  08/23/19  -     LTG 1  AROM R erversion >= 30°.  -     LTG 1 Progress  Met  [facility]     LTG 2  B LE 5/5.  -     LTG 2 Progress  Met  [facility]     LTG 3  Patient able to perform 20 sit to/from stand no UE A, = WB B LEs.  -     LTG 3 Progress  Met  [facility]     LTG 4  Patient able to ambulate up/down 3 steps reciprocally no HRA x10.  -     LTG 4 Progress  Met  [facility]     LTG 5  Patient able ot ambulate with no assistive device non-antalgically 1/2 mile.  -     LTG 5 Progress  Partially Met  [facility]     LTG 6  I with final HEP.  -     LTG 6 Progress  Met  [facility]     LTG 7  Patient able to perform jumping activites with no increas ein pain  -     LTG 7 Progress  Partially Met  [facility]     LTG 8  Patient able to perfrom light  jogging with no increase in pain.  -     LTG 8 Progress  Partially Met  -        Time Calculation    PT Goal Re-Cert Due Date  -- N/A  -       User Key  (r) = Recorded By, (t) = Taken By, (c) = Cosigned By    Initials Name Provider Type    Bridget Lew PTA Physical Therapy Assistant          Therapy Education  Given: HEP  Program: Reinforced  How Provided: Demonstration, Verbal  Provided to: Patient  Level of Understanding: Verbalized, Demonstrated              Time Calculation:   Start Time: 1439  Stop Time: 1600  Time Calculation (min): 81 min  Total Timed Code Minutes- PT: 66 minute(s)  Therapy Charges for Today     Code Description Service Date Service Provider Modifiers Qty    15431675253 HC PT THER PROC EA 15 MIN 8/29/2019 Bridget Mota PTA GP 4    [id number] HC PT THER SUPP EA 15 MIN 8/29/2019 Bridget Mota PTA GP 1                OP PT Discharge Summary  Date of Discharge: 08/29/19  Reason for Discharge: Maximum functional potential achieved, Independent  Outcomes Achieved: Patient able to partially acheive established goals, Able to achieve all goals within established timeline, Refer to plan of care for updates on goals achieved  Discharge Destination: Home with home program  Discharge Instructions/Additional Comments: discharge to free fitness program and independent management.       Bridget Mota PTA  8/29/2019

## 2019-08-29 NOTE — THERAPY TREATMENT NOTE
Outpatient Physical Therapy Ortho Treatment Note  [facility]  Bridget Mota PTA       Patient Name: Olivia Walden  : 1980  MRN: [id number]  Today's Date: 2019      Visit Date: 2019     Visits: 15/15  Insurance Visits Approved: based on medical necessity  Recert Due: N/A  MD Appt: TBD  Pain: pretreatment 1/10; post treatment 2/10  Improvement: pt is subjectively reporting 80% improvement since initial evaluation    Visit Dx:    ICD-10-CM ICD-9-CM   1. Tibia/fibula fracture, right, sequela S82.201S 905.4    S82.401S        Patient Active Problem List   Diagnosis   • Essential hypertension   • Morbid obesity (CMS/HCC)   • Exercise-induced asthma   • Excessive cerumen in left ear canal   • Major depressive disorder with single episode, in full remission (CMS/HCC)   • Ankle sprain   • Atypical Plantar fasciitis   • Decreased hearing of both ears   • Edema   • Chronic pain of left knee   • Primary osteoarthritis of left knee   • Acute radial nerve palsy of left upper extremity        Past Medical History:   Diagnosis Date   • Depression     PHQ score 10, 16   • Hypertension    • Pneumonia         Past Surgical History:   Procedure Laterality Date   • FRACTURE SURGERY Right 2019    ORIF of R tibia   • TONSILLECTOMY  1988       PT Ortho     Row Name 19 1400       Subjective Comments    Subjective Comments  states that he is doing well today.   -       Precautions and Contraindications    Precautions/Limitations  no known precautions/limitations  -       Subjective Pain    Able to rate subjective pain?  yes  -    Pre-Treatment Pain Level  1  -    Post-Treatment Pain Level  2  -    Row Name 19 1400       Precautions and Contraindications    Precautions/Limitations  no known precautions/limitations  -       Subjective Pain    Post-Treatment Pain Level  2  -      User Key  (r) = Recorded By, (t) = Taken By, (c) = Cosigned By    Initials Name  Provider Type     Bridget Mota PTA Physical Therapy Assistant                      PT Assessment/Plan     Row Name 08/29/19 1400          PT Assessment    Assessment Comments  patient able to complete a whole lap on land jogging around the track. good effort throughout. patient demonstrates ability to complete and continue independent fitness   -MH        PT Plan    PT Frequency  2x/week  -MH     PT Plan Comments  discharge to independent HEP  -       User Key  (r) = Recorded By, (t) = Taken By, (c) = Cosigned By    Initials Name Provider Type    Bridget Lew PTA Physical Therapy Assistant          Modalities     Row Name 08/29/19 1400             Ice    Ice Applied  Yes  -      Location  R knee  -MH      Rx Minutes  15 mins  -      Ice S/P Rx  Yes  -MH        User Key  (r) = Recorded By, (t) = Taken By, (c) = Cosigned By    Initials Name Provider Type     Bridget Mota PTA Physical Therapy Assistant        Exercises     Row Name 08/29/19 1400             Subjective Comments    Subjective Comments  states that he is doing well today.   -MH         Subjective Pain    Able to rate subjective pain?  yes  -      Pre-Treatment Pain Level  1  -      Post-Treatment Pain Level  2  -         Exercise 1    Exercise Name 1  EFX  -MH      Time 1  15 minutes  -      Additional Comments  for endurance and cardio  -MH         Exercise 2    Exercise Name 2  B Incline Gastroc/Soleus S  -MH      Reps 2  2  -MH      Time 2  30 sec hold  -MH         Exercise 3    Exercise Name 3  Fast Step  -MH      Reps 3  4  -MH      Time 3  30 secs  -MH         Exercise 4    Exercise Name 4  Shuttle Bounding  -MH      Time 4  3 minutes  -MH      Additional Comments  7 cords  -MH         Exercise 5    Exercise Name 5  B St. Quad S  -MH      Reps 5  2  -MH      Time 5  30 sec hold each  -MH         Exercise 6    Exercise Name 6  BOSU Lunge Push Off  -MH      Time 6  4 minutes alternating legs  -MH         Exercise 7     Exercise Name 7  Airx Plyotoss 2L/1L  -      Sets 7  2  -      Reps 7  20  -         Exercise 8    Exercise Name 8  Inverted BOSU mini squat  -      Reps 8  20  -      Additional Comments  started with HHA, after 8 reps completes remainder no hands  -         Exercise 9    Exercise Name 9  Track Jogging  -      Reps 9  1 lap (330 feet  -         Exercise 10    Exercise Name 10  Jump Roping  -      Reps 10  3  -      Time 10  30 sec hold  -        User Key  (r) = Recorded By, (t) = Taken By, (c) = Cosigned By    Initials Name Provider Type    Bridget Lew, PTA Physical Therapy Assistant                       PT OP Goals     Row Name 08/29/19 1400          PT Short Term Goals    STG Date to Achieve  07/31/19  -     STG 1  I with HEP and have additions/changes by next recertification.  -     STG 1 Progress  Met  [facility]     STG 2  AROm R knee 0°->= 115°  -     STG 2 Progress  Met  [facility]     STG 3  R LE 4+/5 or greater for all.  -     STG 3 Progress  Met  [facility]     STG 4  Decrease in R calf measurements by 2cm.  -     STG 4 Progress  Ongoing;Not Met  [facility]     STG 5  AROM R DF >= 6°.  -     STG 5 Progress  Met  [facility]        Long Term Goals    LTG Date to Achieve  08/23/19  -     LTG 1  AROM R erversion >= 30°.  -     LTG 1 Progress  Met  [facility]     LTG 2  B LE 5/5.  -     LTG 2 Progress  Met  [facility]     LTG 3  Patient able to perform 20 sit to/from stand no UE A, = WB B LEs.  -     LTG 3 Progress  Met  [facility]     LTG 4  Patient able to ambulate up/down 3 steps reciprocally no HRA x10.  -     LTG 4 Progress  Met  [facility]     LTG 5  Patient able ot ambulate with no assistive device non-antalgically 1/2 mile.  -     LTG 5 Progress  Partially Met  [facility]     LTG 6  I with final HEP.  -     LTG 6 Progress  Met  [facility]     LTG 7  Patient able to perform jumping activites with no increas ein pain  -     LTG 7 Progress  Partially Met  [facility]     LTG 8  Patient able to perfrom light jogging with no increase  in pain.  -     LTG 8 Progress  Partially Met  -        Time Calculation    PT Goal Re-Cert Due Date  — N/A  -       User Key  (r) = Recorded By, (t) = Taken By, (c) = Cosigned By    Initials Name Provider Type     Bridget Mota PTA Physical Therapy Assistant          Therapy Education  Given: HEP  Program: Reinforced  How Provided: Demonstration, Verbal  Provided to: Patient  Level of Understanding: Verbalized, Demonstrated              Time Calculation:   Start Time: 1439  Stop Time: 1600  Time Calculation (min): 81 min  Total Timed Code Minutes- PT: 66 minute(s)  Therapy Charges for Today     Code Description Service Date Service Provider Modifiers Qty    66660171366 HC PT THER PROC EA 15 MIN 8/29/2019 Bridget Mota PTA GP 4    82717823139 HC PT THER SUPP EA 15 MIN 8/29/2019 Bridget Mota PTA GP 1                    Bridget Mota PTA  8/29/2019

## 2019-12-17 ENCOUNTER — OFFICE VISIT (OUTPATIENT)
Dept: FAMILY MEDICINE CLINIC | Facility: CLINIC | Age: 39
End: 2019-12-17

## 2019-12-17 VITALS
SYSTOLIC BLOOD PRESSURE: 130 MMHG | BODY MASS INDEX: 43.68 KG/M2 | HEIGHT: 71 IN | OXYGEN SATURATION: 98 % | HEART RATE: 95 BPM | WEIGHT: 312 LBS | DIASTOLIC BLOOD PRESSURE: 78 MMHG

## 2019-12-17 DIAGNOSIS — E66.01 MORBID OBESITY (HCC): Primary | ICD-10-CM

## 2019-12-17 PROCEDURE — 99212 OFFICE O/P EST SF 10 MIN: CPT | Performed by: STUDENT IN AN ORGANIZED HEALTH CARE EDUCATION/TRAINING PROGRAM

## 2019-12-18 NOTE — PROGRESS NOTES
Family Medicine Residency   Helen Jamil MD    Olivia Walden is a 39 y.o. male who presents to family medicine residency clinic for the following:      He  had a right tibia and fibula fracture. It was repaired by [facility]. ROM and pain have improved. He is not back to full activity yet. He does experience some mild swelling. Pain is controlled. He has completed course of PT.     He has hypertension, stable on HCTZ. Today it is 130/78. Asymptomatic.           Past Medical Hx:   Past Medical History:   Diagnosis Date   • Depression     PHQ score 10, 4/11/16   • Hypertension    • Pneumonia        Past Surgical Hx:  Past Surgical History:   Procedure Laterality Date   • FRACTURE SURGERY Right 04/21/2019    ORIF of R tibia   • TONSILLECTOMY  1988       Current Meds:    Current Outpatient Medications:   •  albuterol sulfate  (90 Base) MCG/ACT inhaler, 2 puff(s) 15 minutes before exercise, Disp: 18 g, Rfl: 0  •  hydrochlorothiazide (HYDRODIURIL) 12.5 MG tablet, Take 1 tablet by mouth Daily., Disp: 30 tablet, Rfl: 3  •  meloxicam (MOBIC) 15 MG tablet, Take 1 tablet by mouth Daily. Take with meal daily, Disp: 30 tablet, Rfl: 3    Allergies:  Patient has no known allergies.    Family Hx:  Family History   Problem Relation Age of Onset   • Diabetes Paternal Aunt    • Hypertension Neg Hx    • Pancreatic cancer Neg Hx    • Cancer Neg Hx         Social History:  Social History     Socioeconomic History   • Marital status: Single     Spouse name: Not on file   • Number of children: Not on file   • Years of education: Not on file   • Highest education level: Not on file   Tobacco Use   • Smoking status: Never Smoker   • Smokeless tobacco: Never Used   Substance and Sexual Activity   • Alcohol use: Yes     Comment: social (1-2 drinks/week)   • Drug use: No   • Sexual activity: Defer       Review of Systems  Review of Systems   Constitutional: Negative for chills, diaphoresis and fever.    HENT: Negative for sneezing and sore throat.    Eyes: Negative for pain and discharge.   Respiratory: Negative for cough and shortness of breath.    Gastrointestinal: Negative for constipation, diarrhea, nausea and vomiting.   Endocrine: Negative for cold intolerance and heat intolerance.   Genitourinary: Negative for difficulty urinating, dysuria, frequency and urgency.   Musculoskeletal: Negative for arthralgias and myalgias.   Skin: Negative for color change and pallor.   Allergic/Immunologic: Negative for environmental allergies and food allergies.   Neurological: Negative for dizziness, syncope and weakness.   Psychiatric/Behavioral: Negative for confusion and sleep disturbance.       Physical Exam:  Vitals:    12/17/19 1632   BP: 130/78   Pulse: 95   SpO2: 98%       Body mass index is 43.52 kg/m².   Physical Exam   Constitutional: He is oriented to person, place, and time. He appears well-developed and well-nourished. No distress.   HENT:   Head: Normocephalic and atraumatic.   Nose: Nose normal.   Eyes: Conjunctivae and EOM are normal.   Neck: Normal range of motion. Neck supple.   Cardiovascular: Normal rate, regular rhythm and normal heart sounds.   No murmur heard.  Pulmonary/Chest: Effort normal and breath sounds normal. No respiratory distress. He has no wheezes. He has no rales.   Abdominal: Soft. Bowel sounds are normal. He exhibits no distension. There is no tenderness. There is no guarding.   Musculoskeletal: Normal range of motion. He exhibits edema. He exhibits no tenderness.   Neurological: He is alert and oriented to person, place, and time.   Skin: Skin is warm and dry.   Psychiatric: He has a normal mood and affect. His behavior is normal.   Vitals reviewed.        Data Reviewed:     LABS:   No results found for: GLUCOSE, BUN, CREATININE, EGFRIFNONA, EGFRIFAFRI, BCR, K, CO2, CALCIUM, PROTENTOTREF, ALBUMIN, LABIL2, AST, ALT  Lab Results   Component Value Date    WBC 7.1 04/20/2019    HGB 14.4  04/20/2019    HCT 42.7 04/20/2019    MCV 79.0 (L) 04/20/2019     04/20/2019     No results found for: CHOL, CHLPL, TRIG, HDL, LDL, LDLDIRECT  No results found for: TSH, I6KNWFX, J5DLZMC, THYROIDAB  No results found for: HGBA1C  No results found for: GLUF, MICROALBUR, CREATININE  No results found for: IRON, TIBC, FERRITIN    Assessment/Plan       Leg Fracture  -His left knee pain has improved. He has completed course of PT  -Continue management by Debra     HTN  -stable on HCTZ, asymptoamtic     I will order some yearly labs today.     Health Maintenance  Health Maintenance   Topic Date Due   • INFLUENZA VACCINE  08/01/2019   • ANNUAL PHYSICAL  09/28/2019   • TDAP/TD VACCINES (2 - Td) 04/11/2026       FOLLOW-UP  Return in about 6 months (around 6/17/2020) for Recheck.      ORDERS  There are no discontinued medications.  Olivia was seen today for hypertension.    Diagnoses and all orders for this visit:    Morbid obesity (CMS/HCC)  -     Hemoglobin A1c; Future  -     CBC & Differential; Future  -     Comprehensive Metabolic Panel; Future  -     Lipid Panel; Future  -     TSH Rfx On Abnormal To Free T4; Future        Goals:   Goals        Patient Stated    • lose 10 lbs by follow-up (pt-stated)      Barriers:  none            RISK SCORE: 3        This document has been electronically signed by Helen Jamil MD on December 18, 2019 8:40 AM

## 2019-12-31 NOTE — PROGRESS NOTES
I have reviewed the notes, assessments, and/or procedures performed by Helen Jamil MD during office visit. I concur with her/his documentation and assessment and plan for Olivia Walden.      This document has been electronically signed by Justen Doss MD on December 31, 2019 10:00 AM

## 2021-02-02 ENCOUNTER — OFFICE VISIT (OUTPATIENT)
Dept: FAMILY MEDICINE CLINIC | Facility: CLINIC | Age: 41
End: 2021-02-02

## 2021-02-02 ENCOUNTER — LAB (OUTPATIENT)
Dept: LAB | Facility: HOSPITAL | Age: 41
End: 2021-02-02

## 2021-02-02 VITALS
BODY MASS INDEX: 44.1 KG/M2 | WEIGHT: 315 LBS | HEART RATE: 126 BPM | SYSTOLIC BLOOD PRESSURE: 158 MMHG | OXYGEN SATURATION: 95 % | HEIGHT: 71 IN | TEMPERATURE: 97.6 F | DIASTOLIC BLOOD PRESSURE: 80 MMHG

## 2021-02-02 DIAGNOSIS — I10 ESSENTIAL HYPERTENSION: Primary | ICD-10-CM

## 2021-02-02 DIAGNOSIS — M25.572 CHRONIC ANKLE PAIN, BILATERAL: Chronic | ICD-10-CM

## 2021-02-02 DIAGNOSIS — M25.571 CHRONIC ANKLE PAIN, BILATERAL: Chronic | ICD-10-CM

## 2021-02-02 DIAGNOSIS — G89.29 CHRONIC ANKLE PAIN, BILATERAL: Chronic | ICD-10-CM

## 2021-02-02 DIAGNOSIS — I10 ESSENTIAL HYPERTENSION: ICD-10-CM

## 2021-02-02 PROBLEM — R60.9 EDEMA: Status: RESOLVED | Noted: 2018-01-10 | Resolved: 2021-02-02

## 2021-02-02 PROBLEM — F41.9 ANXIETY: Chronic | Status: ACTIVE | Noted: 2021-02-02

## 2021-02-02 PROBLEM — H61.22 EXCESSIVE CERUMEN IN LEFT EAR CANAL: Status: RESOLVED | Noted: 2017-03-27 | Resolved: 2021-02-02

## 2021-02-02 PROBLEM — S93.409A ANKLE SPRAIN: Status: RESOLVED | Noted: 2017-06-19 | Resolved: 2021-02-02

## 2021-02-02 PROBLEM — G56.32 ACUTE RADIAL NERVE PALSY OF LEFT UPPER EXTREMITY: Status: RESOLVED | Noted: 2019-04-12 | Resolved: 2021-02-02

## 2021-02-02 LAB
ALBUMIN SERPL-MCNC: 4.4 G/DL (ref 3.5–5.2)
ALBUMIN/GLOB SERPL: 1.1 G/DL
ALP SERPL-CCNC: 90 U/L (ref 39–117)
ALT SERPL W P-5'-P-CCNC: 20 U/L (ref 1–41)
ANION GAP SERPL CALCULATED.3IONS-SCNC: 10 MMOL/L (ref 5–15)
AST SERPL-CCNC: 18 U/L (ref 1–40)
BILIRUB SERPL-MCNC: 0.9 MG/DL (ref 0–1.2)
BUN SERPL-MCNC: 12 MG/DL (ref 6–20)
BUN/CREAT SERPL: 11.4 (ref 7–25)
CALCIUM SPEC-SCNC: 9.5 MG/DL (ref 8.6–10.5)
CHLORIDE SERPL-SCNC: 101 MMOL/L (ref 98–107)
CO2 SERPL-SCNC: 31 MMOL/L (ref 22–29)
CREAT SERPL-MCNC: 1.05 MG/DL (ref 0.76–1.27)
DEPRECATED RDW RBC AUTO: 38.8 FL (ref 37–54)
ERYTHROCYTE [DISTWIDTH] IN BLOOD BY AUTOMATED COUNT: 14.5 % (ref 12.3–15.4)
GFR SERPL CREATININE-BSD FRML MDRD: 96 ML/MIN/1.73
GLOBULIN UR ELPH-MCNC: 3.9 GM/DL
GLUCOSE SERPL-MCNC: 93 MG/DL (ref 65–99)
HBA1C MFR BLD: 6 % (ref 4.8–5.6)
HCT VFR BLD AUTO: 41.3 % (ref 37.5–51)
HGB BLD-MCNC: 14.3 G/DL (ref 13–17.7)
MCH RBC QN AUTO: 26 PG (ref 26.6–33)
MCHC RBC AUTO-ENTMCNC: 34.6 G/DL (ref 31.5–35.7)
MCV RBC AUTO: 75.2 FL (ref 79–97)
PLATELET # BLD AUTO: 280 10*3/MM3 (ref 140–450)
PMV BLD AUTO: 10.1 FL (ref 6–12)
POTASSIUM SERPL-SCNC: 4 MMOL/L (ref 3.5–5.2)
PROT SERPL-MCNC: 8.3 G/DL (ref 6–8.5)
RBC # BLD AUTO: 5.49 10*6/MM3 (ref 4.14–5.8)
SODIUM SERPL-SCNC: 142 MMOL/L (ref 136–145)
WBC # BLD AUTO: 6.47 10*3/MM3 (ref 3.4–10.8)

## 2021-02-02 PROCEDURE — 85027 COMPLETE CBC AUTOMATED: CPT

## 2021-02-02 PROCEDURE — 83036 HEMOGLOBIN GLYCOSYLATED A1C: CPT

## 2021-02-02 PROCEDURE — 99213 OFFICE O/P EST LOW 20 MIN: CPT | Performed by: STUDENT IN AN ORGANIZED HEALTH CARE EDUCATION/TRAINING PROGRAM

## 2021-02-02 PROCEDURE — 36415 COLL VENOUS BLD VENIPUNCTURE: CPT

## 2021-02-02 PROCEDURE — 80053 COMPREHEN METABOLIC PANEL: CPT

## 2021-02-02 RX ORDER — MELOXICAM 15 MG/1
15 TABLET ORAL DAILY
Qty: 30 TABLET | Refills: 3 | Status: SHIPPED | OUTPATIENT
Start: 2021-02-02 | End: 2022-06-03 | Stop reason: SDUPTHER

## 2021-02-02 RX ORDER — AMLODIPINE BESYLATE 5 MG/1
5 TABLET ORAL DAILY
COMMUNITY
End: 2021-02-02 | Stop reason: SINTOL

## 2021-02-03 NOTE — PROGRESS NOTES
Attempted to contact pt. Did not answer and voicemail box not set up.       This document has been electronically signed by Edgardo Sevilla MD on February 3, 2021 13:14 CST

## 2021-02-04 NOTE — PROGRESS NOTES
I have helped formulate and discussed the assessment and plan with Dr.Alexander Sevilla.  I have also spoken with the patient  I have spoken with the patient .   I have reviewed the notes, assessments, and/or procedures performed by Dr. Edgardo Sevilla, I concur with his  documentation and assessment and plan for Olivia Walden.    Records from [facility] hospitalization reviewed from April 2019.  He had a ankle fracture requiring intramedullary rodding at that time.        This document has been electronically signed by Roel Mcfarlane MD on February 4, 2021 11:21 CST

## 2022-06-03 ENCOUNTER — OFFICE VISIT (OUTPATIENT)
Dept: FAMILY MEDICINE CLINIC | Facility: CLINIC | Age: 42
End: 2022-06-03

## 2022-06-03 VITALS
OXYGEN SATURATION: 98 % | SYSTOLIC BLOOD PRESSURE: 120 MMHG | TEMPERATURE: 97.3 F | WEIGHT: 315 LBS | BODY MASS INDEX: 44.1 KG/M2 | HEIGHT: 71 IN | DIASTOLIC BLOOD PRESSURE: 80 MMHG | HEART RATE: 93 BPM

## 2022-06-03 DIAGNOSIS — G89.29 CHRONIC PAIN OF RIGHT KNEE: Primary | ICD-10-CM

## 2022-06-03 DIAGNOSIS — M25.561 CHRONIC PAIN OF RIGHT KNEE: Primary | ICD-10-CM

## 2022-06-03 DIAGNOSIS — G56.02 CARPAL TUNNEL SYNDROME ON LEFT: ICD-10-CM

## 2022-06-03 PROCEDURE — 99213 OFFICE O/P EST LOW 20 MIN: CPT | Performed by: STUDENT IN AN ORGANIZED HEALTH CARE EDUCATION/TRAINING PROGRAM

## 2022-06-03 RX ORDER — LOSARTAN POTASSIUM 25 MG/1
25 TABLET ORAL DAILY
COMMUNITY
Start: 2022-03-30 | End: 2022-06-03

## 2022-06-03 RX ORDER — MELOXICAM 15 MG/1
15 TABLET ORAL DAILY
Qty: 30 TABLET | Refills: 3 | Status: SHIPPED | OUTPATIENT
Start: 2022-06-03

## 2022-06-03 RX ORDER — CHLORTHALIDONE 25 MG/1
25 TABLET ORAL EVERY MORNING
COMMUNITY
Start: 2022-03-30

## 2022-06-03 RX ORDER — METHYLPREDNISOLONE 4 MG/1
TABLET ORAL
Qty: 21 TABLET | Refills: 0 | Status: SHIPPED | OUTPATIENT
Start: 2022-06-03

## 2022-06-07 NOTE — PROGRESS NOTES
Family Medicine Residency  Albaro Schuster MD    Subjective:     Olivia Walden is a 41 y.o. male who presents for evaluation of R knee pain. Patient reports he had a tibula-fibula fracture in 2019 that required surgery. Endorses postero-lateral knee pain that is constant. Describes sharp, stabbing pain that is 6-8/10 without radiation. Has been somewhat worsening. Reports morning stiffness for a few minutes. Denies back pain, fever, chills, swelling, instability. Has been taking ibuprofen without much improvement.    Reports L wrist pain and numbness in first 3.5 digits. 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 Describes flicking his hand to relieve pain at onset.    The following portions of the patient's history were reviewed and updated as appropriate: allergies, current medications, past family history, past medical history, past social history, past surgical history and problem list.    Past Medical Hx:  Past Medical History:   Diagnosis Date   • Acute radial nerve palsy of left upper extremity 4/12/2019   • Ankle sprain 6/19/2017   • Depression     PHQ score 10, 4/11/16   • Edema 1/10/2018   • Excessive cerumen in left ear canal 3/27/2017   • Hypertension    • Pneumonia        Past Surgical Hx:  Past Surgical History:   Procedure Laterality Date   • FRACTURE SURGERY Right 04/21/2019    ORIF of R tibia   • TONSILLECTOMY  1988       Current Meds:    Current Outpatient Medications:   •  albuterol sulfate  (90 Base) MCG/ACT inhaler, 2 puff(s) 15 minutes before exercise, Disp: 18 g, Rfl: 0  •  meloxicam (MOBIC) 15 MG tablet, Take 1 tablet by mouth Daily. Take with meal daily, Disp: 30 tablet, Rfl: 3  •  chlorthalidone (HYGROTON) 25 MG tablet, Take 25 mg by mouth Every Morning., Disp: , Rfl:   •  Diclofenac Sodium (VOLTAREN) 1 % gel gel, Apply 4 g topically to the appropriate area as directed 4 (Four) Times a Day As Needed (pain)., Disp: 150 g, Rfl: 0  •  methylPREDNISolone  "(MEDROL) 4 MG dose pack, Take as directed on package instructions., Disp: 21 tablet, Rfl: 0    Allergies:  No Known Allergies    Family Hx:  Family History   Problem Relation Age of Onset   • Diabetes Paternal Aunt    • Hypertension Neg Hx    • Pancreatic cancer Neg Hx    • Cancer Neg Hx         Social History:  Social History     Socioeconomic History   • Marital status: Single   Tobacco Use   • Smoking status: Never Smoker   • Smokeless tobacco: Never Used   Substance and Sexual Activity   • Alcohol use: Yes     Comment: social (1-2 drinks/week)   • Drug use: No   • Sexual activity: Defer       Review of Systems  Review of Systems   Constitutional: Negative for activity change, appetite change, chills, fatigue and fever.   HENT: Negative for congestion, rhinorrhea, sinus pain and sore throat.    Eyes: Negative for pain, redness and visual disturbance.   Respiratory: Negative for cough, shortness of breath and wheezing.    Cardiovascular: Negative for chest pain, palpitations and leg swelling.   Gastrointestinal: Negative for abdominal pain, constipation, diarrhea, nausea and vomiting.   Genitourinary: Negative for dysuria and flank pain.   Musculoskeletal: Positive for arthralgias and gait problem. Negative for joint swelling and myalgias.   Skin: Negative for color change, pallor and rash.   Neurological: Negative for dizziness, numbness and headaches.   Psychiatric/Behavioral: Negative for dysphoric mood and sleep disturbance. The patient is not nervous/anxious.        Objective:     /80   Pulse 93   Temp 97.3 °F (36.3 °C)   Ht 180.3 cm (71\")   Wt (!) 153 kg (337 lb 6.4 oz)   SpO2 98%   BMI 47.06 kg/m²   Physical Exam  Constitutional:       General: He is not in acute distress.     Appearance: Normal appearance. He is well-developed. He is not diaphoretic.   HENT:      Head: Normocephalic and atraumatic.      Right Ear: Hearing normal.      Left Ear: Hearing normal.   Eyes:      General: Lids are " normal.      Conjunctiva/sclera: Conjunctivae normal.   Cardiovascular:      Rate and Rhythm: Normal rate and regular rhythm.      Heart sounds: Normal heart sounds.   Pulmonary:      Effort: Pulmonary effort is normal. No respiratory distress.      Breath sounds: Normal breath sounds. No wheezing or rales.   Abdominal:      General: Bowel sounds are normal. There is no distension.      Palpations: Abdomen is soft.      Tenderness: There is no abdominal tenderness. There is no guarding.   Musculoskeletal:         General: No deformity. Normal range of motion.      Right knee: No swelling or erythema. Tenderness present.      Instability Tests: Anterior Lachman test negative. Medial Rodrick test negative and lateral Rodrick test negative.      Right lower leg: No edema.      Left lower leg: No edema.      Comments: L wrist: phalen positive; tinel negative; strength equal bilaterally   Skin:     General: Skin is warm and dry.      Coloration: Skin is not pale.      Findings: No erythema or rash.   Neurological:      Mental Status: He is alert and oriented to person, place, and time. He is not disoriented.   Psychiatric:         Speech: Speech normal.         Behavior: Behavior normal.          Assessment/Plan:     Diagnoses and all orders for this visit:    1. Chronic pain of right knee (Primary)  -     XR knee 4+ vw right; Future  -     Diclofenac Sodium (VOLTAREN) 1 % gel gel; Apply 4 g topically to the appropriate area as directed 4 (Four) Times a Day As Needed (pain).  Dispense: 150 g; Refill: 0  -     meloxicam (MOBIC) 15 MG tablet; Take 1 tablet by mouth Daily. Take with meal daily  Dispense: 30 tablet; Refill: 3    2. Carpal tunnel syndrome on left  -     methylPREDNISolone (MEDROL) 4 MG dose pack; Take as directed on package instructions.  Dispense: 21 tablet; Refill: 0    1. Will get x-ray to further evaluate. Trial of Mobic an Voltaren gel for symptom relief. Discussed seeing his orthopedic surgeon for  evaluation given history of surgery.    2. Symptoms consistent with carpal tunnel given phalen maneuver positive and description of flick sign. Discussed utilization of wrist brace to be used at nighttime and if possible, during day.     · Rx changes: see a/p  · Patient Education: see a/p  · Compliance at present is estimated to be good.        Follow-up:     Return if symptoms worsen or fail to improve.    Preventative:  Health Maintenance   Topic Date Due   • COVID-19 Vaccine (1) Never done   • HEPATITIS C SCREENING  Never done   • ANNUAL PHYSICAL  09/28/2019   • INFLUENZA VACCINE  08/01/2022   • TDAP/TD VACCINES (3 - Td or Tdap) 04/11/2026   • Pneumococcal Vaccine 0-64  Aged Out         Alcohol use:  reports current alcohol use.  Nicotine status  reports that he has never smoked. He has never used smokeless tobacco.     Goals     •  lose 10 lbs by follow-up (pt-stated)       Barriers:  none              RISK SCORE: 3      Albaro Schuster M.D. PGY3  [facility] Family Medicine Residency  [address] [place] [address]  Office: [phone]  This document has been electronically signed by Albaro Schuster MD on June 7, 2022 14:58 CDT

## 2022-06-08 NOTE — PROGRESS NOTES
I have reviewed the notes, assessments, and/or procedures performed by Albaro Schuster MD, I concur with her/his documentation and assessment and plan for Olivia Walden.                This document has been electronically signed by Brady Vasquez MD on June 8, 2022 11:22 CDT